# Patient Record
Sex: MALE | Race: WHITE | NOT HISPANIC OR LATINO | Employment: OTHER | URBAN - METROPOLITAN AREA
[De-identification: names, ages, dates, MRNs, and addresses within clinical notes are randomized per-mention and may not be internally consistent; named-entity substitution may affect disease eponyms.]

---

## 2017-04-03 ENCOUNTER — TELEPHONE (OUTPATIENT)
Dept: CARDIOLOGY | Facility: CLINIC | Age: 82
End: 2017-04-03

## 2017-04-03 NOTE — TELEPHONE ENCOUNTER
----- Message from Kayla Fam sent at 4/3/2017  1:06 PM CDT -----  Contact: Maame - wife  She returned your call.  Call her at [phone].                                 iverson

## 2017-04-04 ENCOUNTER — OFFICE VISIT (OUTPATIENT)
Dept: CARDIOLOGY | Facility: CLINIC | Age: 82
End: 2017-04-04
Payer: MEDICARE

## 2017-04-04 VITALS
DIASTOLIC BLOOD PRESSURE: 60 MMHG | WEIGHT: 205 LBS | SYSTOLIC BLOOD PRESSURE: 150 MMHG | BODY MASS INDEX: 27.17 KG/M2 | HEART RATE: 49 BPM | HEIGHT: 73 IN

## 2017-04-04 DIAGNOSIS — I49.3 PVC'S (PREMATURE VENTRICULAR CONTRACTIONS): Primary | ICD-10-CM

## 2017-04-04 DIAGNOSIS — I49.3 PVC'S (PREMATURE VENTRICULAR CONTRACTIONS): ICD-10-CM

## 2017-04-04 DIAGNOSIS — I10 ESSENTIAL HYPERTENSION: Primary | Chronic | ICD-10-CM

## 2017-04-04 DIAGNOSIS — E78.5 DYSLIPIDEMIA: Chronic | ICD-10-CM

## 2017-04-04 PROCEDURE — 99213 OFFICE O/P EST LOW 20 MIN: CPT | Mod: PBBFAC,PO | Performed by: NUCLEAR MEDICINE

## 2017-04-04 PROCEDURE — 93010 ELECTROCARDIOGRAM REPORT: CPT | Mod: S$PBB,,, | Performed by: NUCLEAR MEDICINE

## 2017-04-04 PROCEDURE — 99214 OFFICE O/P EST MOD 30 MIN: CPT | Mod: S$PBB,,, | Performed by: NUCLEAR MEDICINE

## 2017-04-04 PROCEDURE — 93005 ELECTROCARDIOGRAM TRACING: CPT | Mod: PBBFAC,PO | Performed by: NUCLEAR MEDICINE

## 2017-04-04 PROCEDURE — 99999 PR PBB SHADOW E&M-EST. PATIENT-LVL III: CPT | Mod: PBBFAC,,, | Performed by: NUCLEAR MEDICINE

## 2017-04-04 NOTE — PROGRESS NOTES
Subjective:   Patient ID:  Bijan Flores Jr. is a 88 y.o. male who presents for follow-up of Hypertension (6 month followup) and Hyperlipidemia      HPI 1- ESSENTIAL HTN   2- DYSLIPIDEMIA.  3- PAD- CAROTID ARTERY DISEASE  DOING WELL. NO HX OF RECENT HOSPITALIZATIONS FOR ACS. OR ADHF. OR ARRHYTHMIAS. OR TIA OR STROKE  NO HX OF ANGINA. OR EQUIVALENT  NO EXACERBATION OF PALPITATIONS. NO NEAR SYNCOPE OR SYNCOPE  NO EDEMA. NO INTERMITTENT CLAUDICATION  NO FOCAL CNS SYMPTOMS OR SIGNS TO SUGGEST TIA OR STROKE  CARD MED GOOD COMPLIANCE    Review of Systems   Constitution: Negative for chills, fever, weakness, night sweats, weight gain and weight loss.   HENT: Negative for headaches and nosebleeds.    Eyes: Negative for blurred vision, double vision and visual disturbance.   Cardiovascular: Negative for chest pain, dyspnea on exertion, irregular heartbeat, leg swelling, orthopnea, palpitations, paroxysmal nocturnal dyspnea and syncope.   Respiratory: Negative for cough, hemoptysis and wheezing.    Endocrine: Negative for polydipsia and polyuria.   Hematologic/Lymphatic: Does not bruise/bleed easily.   Skin: Negative for rash.   Musculoskeletal: Negative for joint pain, joint swelling, muscle weakness and myalgias.   Gastrointestinal: Negative for abdominal pain, hematemesis, jaundice and melena.   Genitourinary: Negative for dysuria, hematuria and nocturia.   Neurological: Negative for dizziness, focal weakness and sensory change.   Psychiatric/Behavioral: Negative for depression. The patient does not have insomnia and is not nervous/anxious.      History reviewed. No pertinent family history.  Past Medical History:   Diagnosis Date    Hyperlipidemia     Hypertension     Occlusion and stenosis of carotid artery without mention of cerebral infarction 6/2/2015    Syncope and collapse 5/22/2015     Current Outpatient Prescriptions on File Prior to Visit   Medication Sig Dispense Refill    carvedilol (COREG) 6.25 MG tablet  TAKE 1 TABLET (6.25 MG TOTAL) BY MOUTH 2 (TWO) TIMES DAILY WITH MEALS. 60 tablet 6    escitalopram oxalate (LEXAPRO) 20 MG tablet Take 1 tablet (20 mg total) by mouth once daily. 90 tablet 3    esomeprazole (NEXIUM) 40 MG capsule Take 1 capsule (40 mg total) by mouth once daily. 90 capsule 3    ezetimibe-simvastatin 10-20 mg (VYTORIN) 10-20 mg per tablet Take 1 tablet by mouth every evening. 90 tablet 3    fish oil-omega-3 fatty acids 300-1,000 mg capsule Take 1 g by mouth once daily.      fluticasone (FLONASE) 50 mcg/actuation nasal spray       lycopene 6 mg Cap Take 10 mg by mouth once daily.       tamsulosin (FLOMAX) 0.4 mg Cp24 Take 0.4 mg by mouth once daily.      valsartan-hydrochlorothiazide (DIOVAN-HCT) 320-12.5 mg per tablet TAKE 1 TABLET BY MOUTH ONCE DAILY. 90 tablet 3    FLUZONE HIGH-DOSE 2016-17, PF, 180 mcg/0.5 mL Syrg TO BE ADMINISTERED BY PHARMACIST FOR IMMUNIZATION  0    mupirocin (BACTROBAN) 2 % ointment Apply topically 2 (two) times daily. 15 g 0     No current facility-administered medications on file prior to visit.      Review of patient's allergies indicates:   Allergen Reactions    Iodinated contrast media - iv dye Anaphylaxis    Pcn [penicillins] Anaphylaxis       Objective:     Physical Exam   Constitutional: He is oriented to person, place, and time. He appears well-developed. No distress.   HENT:   Head: Normocephalic.   Eyes: Conjunctivae are normal. Pupils are equal, round, and reactive to light.   Neck: Neck supple. No JVD present. No thyromegaly present.   Cardiovascular: Regular rhythm, normal heart sounds and intact distal pulses.  Bradycardia present.  Exam reveals no gallop and no friction rub.    No murmur heard.  Pulses:       Carotid pulses are 2+ on the right side, and 2+ on the left side.       Radial pulses are 2+ on the right side, and 2+ on the left side.        Femoral pulses are 2+ on the right side, and 2+ on the left side.       Popliteal pulses are 2+ on  the right side, and 2+ on the left side.        Dorsalis pedis pulses are 2+ on the right side, and 2+ on the left side.        Posterior tibial pulses are 2+ on the right side, and 2+ on the left side.   Pulmonary/Chest: Breath sounds normal. He has no wheezes. He has no rales. He exhibits no tenderness.   Abdominal: Soft. Bowel sounds are normal. He exhibits no mass. There is no hepatosplenomegaly. There is no tenderness.   Musculoskeletal: He exhibits no edema or tenderness.        Cervical back: Normal.        Thoracic back: Normal.        Lumbar back: Normal.   Lymphadenopathy:     He has no cervical adenopathy.     He has no axillary adenopathy.        Right: No supraclavicular adenopathy present.        Left: No supraclavicular adenopathy present.   Neurological: He is alert and oriented to person, place, and time. He has normal strength. No sensory deficit. Gait normal.   Skin: Skin is warm. No cyanosis. No pallor. Nails show no clubbing.   Psychiatric: He has a normal mood and affect. His speech is normal and behavior is normal. Cognition and memory are normal.       Assessment:     1. Essential hypertension    2. Dyslipidemia    WELL CONTROLLED BP  STABLE CNS STATUS  STABLE CV STATUS  CARD MED WELL TOLERATED    Plan:     Essential hypertension    Dyslipidemia    1- CONTINUE PRESENT CARD MANAGEMENT    2- RETURN IN 6 MONTHS.      6/26/17-  ADDENDUM     1- CV STATUS STABLE-  NO EVIDENCE OF ACTIVE MYOCARDIAL ISCHEMIA. NO ARRHYTHMIAS. NO ACUTE HF.  BP WELL CONTROLLED    2- NO CV CONTRAINDICATIONS FOR INGUINAL HERNIA REPAIR AND ANESTHESIA    3- LOW RISK FOR ONESIMO OPERATIVE CV EVENTS    4-NO FURTHER CARD RECOMMENDATIONS OR WORK UP

## 2017-04-04 NOTE — MR AVS SNAPSHOT
[facility] - Cardiology  [address] [facility] [place] [address]  Phone: [phone]  Fax: [phone]                  Bijan Flores JrPaul   2017 9:00 AM   Office Visit    Description:  Male : 1928   Provider:  Geremias Scott MD   Department:  [facility]           Reason for Visit     Hypertension     Hyperlipidemia           Diagnoses this Visit        Comments    Essential hypertension    -  Primary     Dyslipidemia                To Do List           Goals (5 Years of Data)     None      Follow-Up and Disposition     Return in about 6 months (around 10/4/2017).      [facility] On Call     [facility] On Call Nurse Care Line -  Assistance  Unless otherwise directed by your provider, please contact [facility] On-Call, our nurse care line that is available for  assistance.     Registered nurses in the [facility] On Call Center provide: appointment scheduling, clinical advisement, health education, and other advisory services.  Call: [phone] (toll free)               Medications           Message regarding Medications     Verify the changes and/or additions to your medication regime listed below are the same as discussed with your clinician today.  If any of these changes or additions are incorrect, please notify your healthcare provider.             Verify that the below list of medications is an accurate representation of the medications you are currently taking.  If none reported, the list may be blank. If incorrect, please contact your healthcare provider. Carry this list with you in case of emergency.           Current Medications     carvedilol (COREG) 6.25 MG tablet TAKE 1 TABLET (6.25 MG TOTAL) BY MOUTH 2 (TWO) TIMES DAILY WITH MEALS.    escitalopram oxalate (LEXAPRO) 20 MG tablet Take 1 tablet (20 mg total) by mouth once daily.    esomeprazole (NEXIUM) 40 MG capsule Take 1 capsule (40 mg total) by mouth once daily.    ezetimibe-simvastatin 10-20 mg (VYTORIN) 10-20 mg per tablet  "Take 1 tablet by mouth every evening.    fish oil-omega-3 fatty acids 300-1,000 mg capsule Take 1 g by mouth once daily.    fluticasone (FLONASE) 50 mcg/actuation nasal spray     FLUZONE HIGH-DOSE 2016-17, PF, 180 mcg/0.5 mL Syrg TO BE ADMINISTERED BY PHARMACIST FOR IMMUNIZATION    lycopene 6 mg Cap Take 10 mg by mouth once daily.     mupirocin (BACTROBAN) 2 % ointment Apply topically 2 (two) times daily.    tamsulosin (FLOMAX) 0.4 mg Cp24 Take 0.4 mg by mouth once daily.    valsartan-hydrochlorothiazide (DIOVAN-HCT) 320-12.5 mg per tablet TAKE 1 TABLET BY MOUTH ONCE DAILY.           Clinical Reference Information           Your Vitals Were     BP Pulse Height Weight BMI    150/60 (BP Location: Right arm, Patient Position: Sitting, BP Method: Manual) 49 6' 1" (1.854 m) 93 kg (205 lb 0.4 oz) 27.05 kg/m2      Blood Pressure          Most Recent Value    BP  (!)  150/60      Allergies as of 4/4/2017     Iodinated Contrast Media - Iv Dye    Pcn [Penicillins]      Immunizations Administered on Date of Encounter - 4/4/2017     None      Language Assistance Services     ATTENTION: Language assistance services are available, free of charge. Please call [phone].      ATENCIÓN: Si habla español, tiene a ramirez disposición servicios gratuitos de asistencia lingüística. Llame al [phone].     JULIAN Ý: N?u b?n nói Ti?ng Vi?t, có các d?ch v? h? tr? ngôn ng? mi?n phí dành cho b?n. G?i s? [phone].         [facility] complies with applicable Federal civil rights laws and does not discriminate on the basis of race, color, national origin, age, disability, or sex.        "

## 2017-06-03 RX ORDER — CARVEDILOL 6.25 MG/1
TABLET ORAL
Qty: 60 TABLET | Refills: 6 | Status: SHIPPED | OUTPATIENT
Start: 2017-06-03 | End: 2017-07-14 | Stop reason: SINTOL

## 2017-06-14 RX ORDER — ESCITALOPRAM OXALATE 20 MG/1
20 TABLET ORAL DAILY
Qty: 90 TABLET | Refills: 3 | Status: SHIPPED | OUTPATIENT
Start: 2017-06-14 | End: 2018-04-17 | Stop reason: SDUPTHER

## 2017-06-22 ENCOUNTER — CLINICAL SUPPORT (OUTPATIENT)
Dept: CARDIOLOGY | Facility: CLINIC | Age: 82
End: 2017-06-22
Payer: MEDICARE

## 2017-06-22 DIAGNOSIS — Z01.810 PREOP CARDIOVASCULAR EXAM: Primary | ICD-10-CM

## 2017-06-22 DIAGNOSIS — R94.31 EKG ABNORMALITIES: ICD-10-CM

## 2017-06-22 DIAGNOSIS — Z01.810 PREOP CARDIOVASCULAR EXAM: ICD-10-CM

## 2017-06-22 PROCEDURE — 93005 ELECTROCARDIOGRAM TRACING: CPT | Mod: PBBFAC | Performed by: INTERNAL MEDICINE

## 2017-06-22 PROCEDURE — 93010 ELECTROCARDIOGRAM REPORT: CPT | Mod: S$PBB,,, | Performed by: INTERNAL MEDICINE

## 2017-06-23 ENCOUNTER — LAB VISIT (OUTPATIENT)
Dept: LAB | Facility: HOSPITAL | Age: 82
End: 2017-06-23
Attending: NUCLEAR MEDICINE
Payer: MEDICARE

## 2017-06-23 DIAGNOSIS — Z01.810 PREOP CARDIOVASCULAR EXAM: ICD-10-CM

## 2017-06-23 DIAGNOSIS — R94.31 EKG ABNORMALITIES: ICD-10-CM

## 2017-06-23 LAB
ALBUMIN SERPL BCP-MCNC: 3.8 G/DL
ALP SERPL-CCNC: 51 U/L
ALT SERPL W/O P-5'-P-CCNC: 10 U/L
ANION GAP SERPL CALC-SCNC: 8 MMOL/L
AST SERPL-CCNC: 14 U/L
BASOPHILS # BLD AUTO: 0.03 K/UL
BASOPHILS NFR BLD: 0.5 %
BILIRUB SERPL-MCNC: 1.2 MG/DL
BUN SERPL-MCNC: 14 MG/DL
CALCIUM SERPL-MCNC: 9 MG/DL
CHLORIDE SERPL-SCNC: 101 MMOL/L
CO2 SERPL-SCNC: 30 MMOL/L
CREAT SERPL-MCNC: 1.2 MG/DL
DIFFERENTIAL METHOD: ABNORMAL
EOSINOPHIL # BLD AUTO: 0.8 K/UL
EOSINOPHIL NFR BLD: 13.2 %
ERYTHROCYTE [DISTWIDTH] IN BLOOD BY AUTOMATED COUNT: 13.7 %
EST. GFR  (AFRICAN AMERICAN): >60 ML/MIN/1.73 M^2
EST. GFR  (NON AFRICAN AMERICAN): 53.3 ML/MIN/1.73 M^2
GLUCOSE SERPL-MCNC: 71 MG/DL
HCT VFR BLD AUTO: 42.9 %
HGB BLD-MCNC: 14.4 G/DL
LYMPHOCYTES # BLD AUTO: 1.5 K/UL
LYMPHOCYTES NFR BLD: 24.7 %
MCH RBC QN AUTO: 30.1 PG
MCHC RBC AUTO-ENTMCNC: 33.6 %
MCV RBC AUTO: 90 FL
MONOCYTES # BLD AUTO: 0.6 K/UL
MONOCYTES NFR BLD: 9.3 %
NEUTROPHILS # BLD AUTO: 3.1 K/UL
NEUTROPHILS NFR BLD: 52.1 %
PLATELET # BLD AUTO: 132 K/UL
PMV BLD AUTO: 11.9 FL
POTASSIUM SERPL-SCNC: 3.9 MMOL/L
PROT SERPL-MCNC: 6.7 G/DL
RBC # BLD AUTO: 4.78 M/UL
SODIUM SERPL-SCNC: 139 MMOL/L
WBC # BLD AUTO: 5.99 K/UL

## 2017-06-23 PROCEDURE — 36415 COLL VENOUS BLD VENIPUNCTURE: CPT | Mod: PO

## 2017-06-23 PROCEDURE — 85025 COMPLETE CBC W/AUTO DIFF WBC: CPT

## 2017-06-23 PROCEDURE — 80053 COMPREHEN METABOLIC PANEL: CPT

## 2017-07-01 DIAGNOSIS — I10 ESSENTIAL HYPERTENSION: ICD-10-CM

## 2017-07-01 RX ORDER — EZETIMIBE/SIMVASTATIN 10 MG-20MG
TABLET ORAL
Qty: 90 TABLET | Refills: 3 | Status: SHIPPED | OUTPATIENT
Start: 2017-07-01 | End: 2017-07-07 | Stop reason: SDUPTHER

## 2017-07-06 ENCOUNTER — TELEPHONE (OUTPATIENT)
Dept: CARDIOLOGY | Facility: CLINIC | Age: 82
End: 2017-07-06

## 2017-07-06 NOTE — TELEPHONE ENCOUNTER
----- Message from Antoinette Duran sent at 7/6/2017 12:01 PM CDT -----  Contact: Maame/wife  Maame called to see if the patient can be worked in today or tomorrow because BP was 250/197 today. She can be contacted at [phone] home or [phone] cell.    Thanks,  Antoinette

## 2017-07-07 ENCOUNTER — OFFICE VISIT (OUTPATIENT)
Dept: CARDIOLOGY | Facility: CLINIC | Age: 82
End: 2017-07-07
Payer: MEDICARE

## 2017-07-07 ENCOUNTER — CLINICAL SUPPORT (OUTPATIENT)
Dept: CARDIOLOGY | Facility: CLINIC | Age: 82
End: 2017-07-07
Payer: MEDICARE

## 2017-07-07 VITALS
WEIGHT: 207 LBS | BODY MASS INDEX: 27.43 KG/M2 | DIASTOLIC BLOOD PRESSURE: 74 MMHG | HEART RATE: 54 BPM | SYSTOLIC BLOOD PRESSURE: 156 MMHG | HEIGHT: 73 IN

## 2017-07-07 DIAGNOSIS — I10 ESSENTIAL HYPERTENSION: Primary | ICD-10-CM

## 2017-07-07 DIAGNOSIS — I10 ESSENTIAL HYPERTENSION: ICD-10-CM

## 2017-07-07 DIAGNOSIS — I10 BENIGN ESSENTIAL HTN: Primary | ICD-10-CM

## 2017-07-07 DIAGNOSIS — I10 BENIGN ESSENTIAL HTN: ICD-10-CM

## 2017-07-07 DIAGNOSIS — F41.9 ANXIETY: ICD-10-CM

## 2017-07-07 PROCEDURE — 1126F AMNT PAIN NOTED NONE PRSNT: CPT | Mod: ,,, | Performed by: NUCLEAR MEDICINE

## 2017-07-07 PROCEDURE — 99215 OFFICE O/P EST HI 40 MIN: CPT | Mod: S$PBB,,, | Performed by: NUCLEAR MEDICINE

## 2017-07-07 PROCEDURE — 1159F MED LIST DOCD IN RCRD: CPT | Mod: ,,, | Performed by: NUCLEAR MEDICINE

## 2017-07-07 PROCEDURE — 93010 ELECTROCARDIOGRAM REPORT: CPT | Mod: S$PBB,,, | Performed by: INTERNAL MEDICINE

## 2017-07-07 PROCEDURE — 93005 ELECTROCARDIOGRAM TRACING: CPT | Mod: PBBFAC,PO | Performed by: INTERNAL MEDICINE

## 2017-07-07 PROCEDURE — 99999 PR PBB SHADOW E&M-EST. PATIENT-LVL III: CPT | Mod: PBBFAC,,, | Performed by: NUCLEAR MEDICINE

## 2017-07-07 RX ORDER — SPIRONOLACTONE 25 MG/1
25 TABLET ORAL DAILY
Qty: 30 TABLET | Refills: 3 | Status: SHIPPED | OUTPATIENT
Start: 2017-07-07 | End: 2017-07-14 | Stop reason: SDUPTHER

## 2017-07-07 RX ORDER — EZETIMIBE AND SIMVASTATIN 10; 20 MG/1; MG/1
1 TABLET ORAL NIGHTLY
Qty: 90 TABLET | Refills: 3 | Status: SHIPPED | OUTPATIENT
Start: 2017-07-07 | End: 2018-06-04 | Stop reason: SDUPTHER

## 2017-07-07 RX ORDER — ALPRAZOLAM 0.25 MG/1
0.25 TABLET ORAL 2 TIMES DAILY PRN
Qty: 30 TABLET | Refills: 0 | Status: SHIPPED | OUTPATIENT
Start: 2017-07-07 | End: 2018-04-17

## 2017-07-07 NOTE — PROGRESS NOTES
Subjective:   Patient ID:  Bijan Flores Jr. is a 89 y.o. male who presents for follow-up of Hypertension      HPI  ESSENTIAL HTN.    WHILE AT RECOVERING PRIOR TO INGUINAL HERNIA REPAIR, WAS NOTICED HIS SBP  WAS SIGNIFICANTLY ELEVATED > 200  MMHG  PATIENT WAS ASYMPTOMATIC- NO ANGINA NO HEADACHE, NO UNUSUAL SOB, NO PALPITATIONS. NO FOCAL CNS SYMPTOMS OR SIGNS TO SUGGEST TIA OR STROKE  HOWEVER  HE HAS BEEN VERY ANXIOUS BECAUSE OF PROCEDURE  PRESENTLY- HE IS VERY COMFORTABLE  AND ASYMPTOMATIC  ECG TODAY- MILD SB 54 BMP. CHRONIC NS ST T CHANGES.    Review of Systems   Constitution: Negative for chills, fever, weakness, night sweats, weight gain and weight loss.   HENT: Negative for headaches and nosebleeds.    Eyes: Negative for blurred vision, double vision and visual disturbance.   Cardiovascular: Negative for chest pain, dyspnea on exertion, irregular heartbeat, leg swelling, orthopnea, palpitations, paroxysmal nocturnal dyspnea and syncope.   Respiratory: Negative for cough, hemoptysis and wheezing.    Endocrine: Negative for polydipsia and polyuria.   Hematologic/Lymphatic: Does not bruise/bleed easily.   Skin: Negative for rash.   Musculoskeletal: Negative for joint pain, joint swelling, muscle weakness and myalgias.   Gastrointestinal: Negative for abdominal pain, hematemesis, jaundice and melena.   Genitourinary: Negative for dysuria, hematuria and nocturia.   Neurological: Negative for dizziness, focal weakness and sensory change.   Psychiatric/Behavioral: Negative for depression. The patient is nervous/anxious. The patient does not have insomnia.      History reviewed. No pertinent family history.  Past Medical History:   Diagnosis Date    Hyperlipidemia     Hypertension     Occlusion and stenosis of carotid artery without mention of cerebral infarction 6/2/2015    Syncope and collapse 5/22/2015     Current Outpatient Prescriptions on File Prior to Visit   Medication Sig Dispense Refill    carvedilol  (COREG) 6.25 MG tablet TAKE 1 TABLET (6.25 MG TOTAL) BY MOUTH 2 (TWO) TIMES DAILY WITH MEALS. 60 tablet 6    escitalopram oxalate (LEXAPRO) 20 MG tablet TAKE 1 TABLET (20 MG TOTAL) BY MOUTH ONCE DAILY. 90 tablet 3    esomeprazole (NEXIUM) 40 MG capsule Take 1 capsule (40 mg total) by mouth once daily. 90 capsule 3    fish oil-omega-3 fatty acids 300-1,000 mg capsule Take 1 g by mouth once daily.      fluticasone (FLONASE) 50 mcg/actuation nasal spray       lycopene 6 mg Cap Take 10 mg by mouth once daily.       mupirocin (BACTROBAN) 2 % ointment Apply topically 2 (two) times daily. 15 g 0    tamsulosin (FLOMAX) 0.4 mg Cp24 Take 0.4 mg by mouth once daily.      valsartan-hydrochlorothiazide (DIOVAN-HCT) 320-12.5 mg per tablet TAKE 1 TABLET BY MOUTH ONCE DAILY. 90 tablet 3    VYTORIN 10-20 10-20 mg per tablet TAKE 1 TABLET EVERY EVENING 90 tablet 3    [DISCONTINUED] FLUZONE HIGH-DOSE 2016-17, PF, 180 mcg/0.5 mL Syrg TO BE ADMINISTERED BY PHARMACIST FOR IMMUNIZATION  0     No current facility-administered medications on file prior to visit.      Review of patient's allergies indicates:   Allergen Reactions    Iodinated contrast- oral and iv dye Anaphylaxis    Pcn [penicillins] Anaphylaxis       Objective:     Physical Exam   Constitutional: He is oriented to person, place, and time. He appears well-developed. No distress.   HENT:   Head: Normocephalic.   Eyes: Conjunctivae are normal. Pupils are equal, round, and reactive to light.   Neck: Neck supple. No JVD present. No thyromegaly present.   Cardiovascular: Normal rate, regular rhythm, normal heart sounds and intact distal pulses.  Exam reveals no gallop and no friction rub.    No murmur heard.  Pulses:       Carotid pulses are 2+ on the right side, and 2+ on the left side.       Radial pulses are 2+ on the right side, and 2+ on the left side.        Femoral pulses are 2+ on the right side, and 2+ on the left side.       Popliteal pulses are 2+ on the  right side, and 2+ on the left side.        Dorsalis pedis pulses are 2+ on the right side, and 2+ on the left side.        Posterior tibial pulses are 2+ on the right side, and 2+ on the left side.   Pulmonary/Chest: Breath sounds normal. He has no wheezes. He has no rales. He exhibits no tenderness.   Abdominal: Soft. Bowel sounds are normal. He exhibits no mass. There is no hepatosplenomegaly. There is no tenderness.   Musculoskeletal: He exhibits no edema or tenderness.        Cervical back: Normal.        Thoracic back: Normal.        Lumbar back: Normal.   Lymphadenopathy:     He has no cervical adenopathy.     He has no axillary adenopathy.        Right: No supraclavicular adenopathy present.        Left: No supraclavicular adenopathy present.   Neurological: He is alert and oriented to person, place, and time. He has normal strength. No sensory deficit. Gait normal.   Skin: Skin is warm. No cyanosis. No pallor. Nails show no clubbing.   Psychiatric: He has a normal mood and affect. His speech is normal and behavior is normal. Cognition and memory are normal.       Assessment:     1. Essential hypertension      UNCONTROLLED SBP STAGE 1  NO CLINICAL EVIDENCE OF ACTIVE MYOCARDIAL ISCHEMIA. NO ARRHYTHMIAS. NO ADHF  CNS STATUS STABLE    Plan:     Essential hypertension    1- START ALPRAZOLAM  0.25 MG BID    2- START ALDACTONE 25 MG AT BEDTIME    3- RETURN IN ONE WEEK WITH BMP

## 2017-07-14 ENCOUNTER — LAB VISIT (OUTPATIENT)
Dept: LAB | Facility: HOSPITAL | Age: 82
End: 2017-07-14
Attending: NUCLEAR MEDICINE
Payer: MEDICARE

## 2017-07-14 ENCOUNTER — OFFICE VISIT (OUTPATIENT)
Dept: CARDIOLOGY | Facility: CLINIC | Age: 82
End: 2017-07-14
Payer: MEDICARE

## 2017-07-14 VITALS
DIASTOLIC BLOOD PRESSURE: 78 MMHG | BODY MASS INDEX: 27.61 KG/M2 | SYSTOLIC BLOOD PRESSURE: 134 MMHG | HEART RATE: 44 BPM | WEIGHT: 208.31 LBS | HEIGHT: 73 IN

## 2017-07-14 DIAGNOSIS — I10 ESSENTIAL HYPERTENSION: Primary | ICD-10-CM

## 2017-07-14 DIAGNOSIS — I10 ESSENTIAL HYPERTENSION: ICD-10-CM

## 2017-07-14 LAB
ANION GAP SERPL CALC-SCNC: 8 MMOL/L
BUN SERPL-MCNC: 18 MG/DL
CALCIUM SERPL-MCNC: 8.6 MG/DL
CHLORIDE SERPL-SCNC: 103 MMOL/L
CO2 SERPL-SCNC: 26 MMOL/L
CREAT SERPL-MCNC: 1.3 MG/DL
EST. GFR  (AFRICAN AMERICAN): 55.9 ML/MIN/1.73 M^2
EST. GFR  (NON AFRICAN AMERICAN): 48.4 ML/MIN/1.73 M^2
GLUCOSE SERPL-MCNC: 108 MG/DL
POTASSIUM SERPL-SCNC: 3.7 MMOL/L
SODIUM SERPL-SCNC: 137 MMOL/L

## 2017-07-14 PROCEDURE — 1159F MED LIST DOCD IN RCRD: CPT | Mod: ,,, | Performed by: NUCLEAR MEDICINE

## 2017-07-14 PROCEDURE — 99213 OFFICE O/P EST LOW 20 MIN: CPT | Mod: PBBFAC,PO | Performed by: NUCLEAR MEDICINE

## 2017-07-14 PROCEDURE — 99214 OFFICE O/P EST MOD 30 MIN: CPT | Mod: S$PBB,,, | Performed by: NUCLEAR MEDICINE

## 2017-07-14 PROCEDURE — 99999 PR PBB SHADOW E&M-EST. PATIENT-LVL III: CPT | Mod: PBBFAC,,, | Performed by: NUCLEAR MEDICINE

## 2017-07-14 RX ORDER — SPIRONOLACTONE 25 MG/1
50 TABLET ORAL DAILY
Qty: 30 TABLET | Refills: 3
Start: 2017-07-14 | End: 2017-07-17 | Stop reason: SDUPTHER

## 2017-07-14 NOTE — PROGRESS NOTES
Subjective:   Patient ID:  Bijan Flores Jr. is a 89 y.o. male who presents for follow-up of Hypertension      HPI ESSENTIAL HYPERTENSION  STILL HAVING PROBLEMS WITH BP AT HOME AND ALSO SLOWING HR IN THE 40- ASYMPTOMATIC  NO NEAR SYNCOPE OR SYNCOPE  NO UNUSUAL KUHN. NO FATIGUE   NO ORTHOPNEA OR PND  NO EDEMA. NO INTERMITTENT CLAUDICATION  NO FOCAL CNS SYMPTOMS OR SIGNS TO SUGGEST TIA OR STROKE    Review of Systems   Constitution: Negative for chills, fever, weakness, night sweats, weight gain and weight loss.   HENT: Negative for headaches and nosebleeds.    Eyes: Negative for blurred vision, double vision and visual disturbance.   Cardiovascular: Negative for chest pain, dyspnea on exertion, irregular heartbeat, leg swelling, orthopnea, palpitations, paroxysmal nocturnal dyspnea and syncope.   Respiratory: Negative for cough, hemoptysis and wheezing.    Endocrine: Negative for polydipsia and polyuria.   Hematologic/Lymphatic: Does not bruise/bleed easily.   Skin: Negative for rash.   Musculoskeletal: Negative for joint pain, joint swelling, muscle weakness and myalgias.   Gastrointestinal: Negative for abdominal pain, hematemesis, jaundice and melena.   Genitourinary: Negative for dysuria, hematuria and nocturia.   Neurological: Negative for dizziness, focal weakness and sensory change.   Psychiatric/Behavioral: Negative for depression. The patient does not have insomnia and is not nervous/anxious.      History reviewed. No pertinent family history.  Past Medical History:   Diagnosis Date    Hyperlipidemia     Hypertension     Occlusion and stenosis of carotid artery without mention of cerebral infarction 6/2/2015    Syncope and collapse 5/22/2015     Current Outpatient Prescriptions on File Prior to Visit   Medication Sig Dispense Refill    alprazolam (XANAX) 0.25 MG tablet Take 1 tablet (0.25 mg total) by mouth 2 (two) times daily as needed for Anxiety. 30 tablet 0    escitalopram oxalate (LEXAPRO) 20 MG  tablet TAKE 1 TABLET (20 MG TOTAL) BY MOUTH ONCE DAILY. 90 tablet 3    esomeprazole (NEXIUM) 40 MG capsule Take 1 capsule (40 mg total) by mouth once daily. 90 capsule 3    ezetimibe-simvastatin 10-20 mg (VYTORIN 10-20) 10-20 mg per tablet Take 1 tablet by mouth every evening. 90 tablet 3    fluticasone (FLONASE) 50 mcg/actuation nasal spray       lycopene 6 mg Cap Take 10 mg by mouth once daily.       mupirocin (BACTROBAN) 2 % ointment Apply topically 2 (two) times daily. 15 g 0    tamsulosin (FLOMAX) 0.4 mg Cp24 Take 0.4 mg by mouth once daily.      valsartan-hydrochlorothiazide (DIOVAN-HCT) 320-12.5 mg per tablet TAKE 1 TABLET BY MOUTH ONCE DAILY. 90 tablet 3    [DISCONTINUED] carvedilol (COREG) 6.25 MG tablet TAKE 1 TABLET (6.25 MG TOTAL) BY MOUTH 2 (TWO) TIMES DAILY WITH MEALS. 60 tablet 6    [DISCONTINUED] spironolactone (ALDACTONE) 25 MG tablet Take 1 tablet (25 mg total) by mouth once daily. 30 tablet 3    [DISCONTINUED] fish oil-omega-3 fatty acids 300-1,000 mg capsule Take 1 g by mouth once daily.       No current facility-administered medications on file prior to visit.      Review of patient's allergies indicates:   Allergen Reactions    Iodinated contrast- oral and iv dye Anaphylaxis    Pcn [penicillins] Anaphylaxis       Objective:     Physical Exam   Constitutional: He is oriented to person, place, and time. He appears well-developed. No distress.   HENT:   Head: Normocephalic.   Eyes: Conjunctivae are normal. Pupils are equal, round, and reactive to light.   Neck: Neck supple. No JVD present. No thyromegaly present.   Cardiovascular: Regular rhythm, normal heart sounds and intact distal pulses.  Bradycardia present.  Exam reveals no gallop and no friction rub.    No murmur heard.  Pulses:       Carotid pulses are 2+ on the right side, and 2+ on the left side.       Radial pulses are 2+ on the right side, and 2+ on the left side.        Femoral pulses are 2+ on the right side, and 2+ on  the left side.       Popliteal pulses are 2+ on the right side, and 2+ on the left side.        Dorsalis pedis pulses are 2+ on the right side, and 2+ on the left side.        Posterior tibial pulses are 2+ on the right side, and 2+ on the left side.   Pulmonary/Chest: Breath sounds normal. He has no wheezes. He has no rales. He exhibits no tenderness.   Abdominal: Soft. Bowel sounds are normal. He exhibits no mass. There is no hepatosplenomegaly. There is no tenderness.   Musculoskeletal: He exhibits no edema or tenderness.        Cervical back: Normal.        Thoracic back: Normal.        Lumbar back: Normal.   Lymphadenopathy:     He has no cervical adenopathy.     He has no axillary adenopathy.        Right: No supraclavicular adenopathy present.        Left: No supraclavicular adenopathy present.   Neurological: He is alert and oriented to person, place, and time. He has normal strength. No sensory deficit. Gait normal.   Skin: Skin is warm. No cyanosis. No pallor. Nails show no clubbing.   Psychiatric: He has a normal mood and affect. His speech is normal and behavior is normal. Cognition and memory are normal.       Assessment:     1. Essential hypertension      WE NEED TO CONTINUE OMT ANTIHYPERTENSIVE MEDS  Plan:     Essential hypertension  -     spironolactone (ALDACTONE) 25 MG tablet; Take 2 tablets (50 mg total) by mouth once daily.  Dispense: 30 tablet; Refill: 3    1- DC COREG    2- INCREASE ALDACTONE    3- CONTINUE DIOVAN/ HCTZ

## 2017-07-17 DIAGNOSIS — I10 ESSENTIAL HYPERTENSION: ICD-10-CM

## 2017-07-17 RX ORDER — SPIRONOLACTONE 25 MG/1
50 TABLET ORAL DAILY
Qty: 180 TABLET | Refills: 3 | Status: SHIPPED | OUTPATIENT
Start: 2017-07-17 | End: 2018-07-13 | Stop reason: SDUPTHER

## 2017-08-02 RX ORDER — VALSARTAN AND HYDROCHLOROTHIAZIDE 320; 12.5 MG/1; MG/1
TABLET, FILM COATED ORAL
Qty: 90 TABLET | Refills: 3 | Status: SHIPPED | OUTPATIENT
Start: 2017-08-02 | End: 2018-07-24 | Stop reason: SDUPTHER

## 2017-08-14 ENCOUNTER — OFFICE VISIT (OUTPATIENT)
Dept: CARDIOLOGY | Facility: CLINIC | Age: 82
End: 2017-08-14
Payer: MEDICARE

## 2017-08-14 ENCOUNTER — LAB VISIT (OUTPATIENT)
Dept: LAB | Facility: HOSPITAL | Age: 82
End: 2017-08-14
Attending: NUCLEAR MEDICINE
Payer: MEDICARE

## 2017-08-14 VITALS
HEIGHT: 72 IN | WEIGHT: 202 LBS | BODY MASS INDEX: 27.36 KG/M2 | DIASTOLIC BLOOD PRESSURE: 60 MMHG | SYSTOLIC BLOOD PRESSURE: 116 MMHG

## 2017-08-14 DIAGNOSIS — I10 ESSENTIAL HYPERTENSION: ICD-10-CM

## 2017-08-14 DIAGNOSIS — I10 ESSENTIAL HYPERTENSION: Primary | ICD-10-CM

## 2017-08-14 LAB
ANION GAP SERPL CALC-SCNC: 9 MMOL/L
BUN SERPL-MCNC: 22 MG/DL
CALCIUM SERPL-MCNC: 8.7 MG/DL
CHLORIDE SERPL-SCNC: 104 MMOL/L
CO2 SERPL-SCNC: 26 MMOL/L
CREAT SERPL-MCNC: 1.5 MG/DL
EST. GFR  (AFRICAN AMERICAN): 47 ML/MIN/1.73 M^2
EST. GFR  (NON AFRICAN AMERICAN): 41 ML/MIN/1.73 M^2
GLUCOSE SERPL-MCNC: 94 MG/DL
POTASSIUM SERPL-SCNC: 3.9 MMOL/L
SODIUM SERPL-SCNC: 139 MMOL/L

## 2017-08-14 PROCEDURE — 99999 PR PBB SHADOW E&M-EST. PATIENT-LVL III: CPT | Mod: PBBFAC,,, | Performed by: NUCLEAR MEDICINE

## 2017-08-14 PROCEDURE — 1159F MED LIST DOCD IN RCRD: CPT | Mod: ,,, | Performed by: NUCLEAR MEDICINE

## 2017-08-14 PROCEDURE — 36415 COLL VENOUS BLD VENIPUNCTURE: CPT | Mod: PO

## 2017-08-14 PROCEDURE — 1126F AMNT PAIN NOTED NONE PRSNT: CPT | Mod: ,,, | Performed by: NUCLEAR MEDICINE

## 2017-08-14 PROCEDURE — 3008F BODY MASS INDEX DOCD: CPT | Mod: ,,, | Performed by: NUCLEAR MEDICINE

## 2017-08-14 PROCEDURE — 99214 OFFICE O/P EST MOD 30 MIN: CPT | Mod: S$PBB,,, | Performed by: NUCLEAR MEDICINE

## 2017-08-14 PROCEDURE — 80048 BASIC METABOLIC PNL TOTAL CA: CPT | Mod: PO

## 2017-08-14 NOTE — PROGRESS NOTES
Subjective:   Patient ID:  Bijan Flores Jr. is a 89 y.o. male who presents for follow-up of Hypertension (4 week followup) and Shortness of Breath      HPI 1- ESSENTIAL HTN.  DOING WELL.  CARD MED WELL TOLERATED  NO RECENT FALLS. NO NEAR SYNCOPE OR SYNCOPE  NO HX OF ANGINA OR EQUIVALENT. NO HX OF PLEURITIC CP  NO HX OF PALPITATIONS. NO   NO HX OF UNUSUAL KUHN. NO ORTHOPNEA OR PND  NO EDEMA. NO CALVE TENDERNESS  NO INTERMITTENT CLAUDICATION    Review of Systems   Constitution: Negative for chills, fever, weakness, night sweats, weight gain and weight loss.   HENT: Negative for headaches and nosebleeds.    Eyes: Negative for blurred vision, double vision and visual disturbance.   Cardiovascular: Negative for chest pain, dyspnea on exertion, irregular heartbeat, leg swelling, orthopnea, palpitations, paroxysmal nocturnal dyspnea and syncope.   Respiratory: Negative for cough, hemoptysis and wheezing.    Endocrine: Negative for polydipsia and polyuria.   Hematologic/Lymphatic: Does not bruise/bleed easily.   Skin: Negative for rash.   Musculoskeletal: Negative for joint pain, joint swelling, muscle weakness and myalgias.   Gastrointestinal: Negative for abdominal pain, hematemesis, jaundice and melena.   Genitourinary: Negative for dysuria, hematuria and nocturia.   Neurological: Negative for dizziness, focal weakness and sensory change.   Psychiatric/Behavioral: Negative for depression. The patient does not have insomnia and is not nervous/anxious.      History reviewed. No pertinent family history.  Past Medical History:   Diagnosis Date    Hyperlipidemia     Hypertension     Occlusion and stenosis of carotid artery without mention of cerebral infarction 6/2/2015    Syncope and collapse 5/22/2015     Current Outpatient Prescriptions on File Prior to Visit   Medication Sig Dispense Refill    alprazolam (XANAX) 0.25 MG tablet Take 1 tablet (0.25 mg total) by mouth 2 (two) times daily as needed for Anxiety. 30  tablet 0    escitalopram oxalate (LEXAPRO) 20 MG tablet TAKE 1 TABLET (20 MG TOTAL) BY MOUTH ONCE DAILY. 90 tablet 3    esomeprazole (NEXIUM) 40 MG capsule Take 1 capsule (40 mg total) by mouth once daily. 90 capsule 3    ezetimibe-simvastatin 10-20 mg (VYTORIN 10-20) 10-20 mg per tablet Take 1 tablet by mouth every evening. 90 tablet 3    fluticasone (FLONASE) 50 mcg/actuation nasal spray       lycopene 6 mg Cap Take 10 mg by mouth once daily.       spironolactone (ALDACTONE) 25 MG tablet Take 2 tablets (50 mg total) by mouth once daily. 180 tablet 3    tamsulosin (FLOMAX) 0.4 mg Cp24 Take 0.4 mg by mouth once daily.      valsartan-hydrochlorothiazide (DIOVAN-HCT) 320-12.5 mg per tablet TAKE 1 TABLET BY MOUTH ONCE DAILY. 90 tablet 3    [DISCONTINUED] mupirocin (BACTROBAN) 2 % ointment Apply topically 2 (two) times daily. 15 g 0     No current facility-administered medications on file prior to visit.      Review of patient's allergies indicates:   Allergen Reactions    Iodinated contrast- oral and iv dye Anaphylaxis    Pcn [penicillins] Anaphylaxis       Objective:     Physical Exam   Constitutional: He is oriented to person, place, and time. He appears well-developed. No distress.   HENT:   Head: Normocephalic.   Eyes: Conjunctivae are normal. Pupils are equal, round, and reactive to light.   Neck: Neck supple. No JVD present. No thyromegaly present.   Cardiovascular: Normal rate, regular rhythm, normal heart sounds and intact distal pulses.  Exam reveals no gallop and no friction rub.    No murmur heard.  Pulses:       Carotid pulses are 2+ on the right side, and 2+ on the left side.       Radial pulses are 2+ on the right side, and 2+ on the left side.        Femoral pulses are 2+ on the right side, and 2+ on the left side.       Popliteal pulses are 2+ on the right side, and 2+ on the left side.        Dorsalis pedis pulses are 2+ on the right side, and 2+ on the left side.        Posterior tibial  pulses are 2+ on the right side, and 2+ on the left side.   Pulmonary/Chest: Breath sounds normal. He has no wheezes. He has no rales. He exhibits no tenderness.   Abdominal: Soft. Bowel sounds are normal. He exhibits no mass. There is no hepatosplenomegaly. There is no tenderness.   Musculoskeletal: He exhibits no edema or tenderness.        Cervical back: Normal.        Thoracic back: Normal.        Lumbar back: Normal.   Lymphadenopathy:     He has no cervical adenopathy.     He has no axillary adenopathy.        Right: No supraclavicular adenopathy present.        Left: No supraclavicular adenopathy present.   Neurological: He is alert and oriented to person, place, and time. He has normal strength. No sensory deficit. Gait normal.   Skin: Skin is warm. No cyanosis. No pallor. Nails show no clubbing.   Psychiatric: He has a normal mood and affect. His speech is normal and behavior is normal. Cognition and memory are normal.       Assessment:     1. Essential hypertension      WELL CONTROLLED BP  STABLE CV STATUS  STABLE CNS STATUS    Plan:     Essential hypertension    1- LOW RISK FOR PERIOPERATIVE CV EVENTS - INGUINAL HERNIA REPAIR AND ANESTHESIA    2- CONTINUE PRESENT CARD MANAGEMENT    3- START BMP    4- RETURN IN 3 MONTHS WITH BMP

## 2017-11-20 ENCOUNTER — OFFICE VISIT (OUTPATIENT)
Dept: CARDIOLOGY | Facility: CLINIC | Age: 82
End: 2017-11-20
Payer: MEDICARE

## 2017-11-20 VITALS
HEART RATE: 56 BPM | HEIGHT: 72 IN | WEIGHT: 205.25 LBS | DIASTOLIC BLOOD PRESSURE: 70 MMHG | BODY MASS INDEX: 27.8 KG/M2 | SYSTOLIC BLOOD PRESSURE: 126 MMHG

## 2017-11-20 DIAGNOSIS — I10 ESSENTIAL HYPERTENSION: Primary | ICD-10-CM

## 2017-11-20 DIAGNOSIS — I65.21 CAROTID ARTERY CALCIFICATION, RIGHT: Chronic | ICD-10-CM

## 2017-11-20 DIAGNOSIS — Z98.890 HISTORY OF RIGHT-SIDED CAROTID ENDARTERECTOMY: Chronic | ICD-10-CM

## 2017-11-20 PROCEDURE — 99214 OFFICE O/P EST MOD 30 MIN: CPT | Mod: S$PBB,,, | Performed by: NUCLEAR MEDICINE

## 2017-11-20 PROCEDURE — 99999 PR PBB SHADOW E&M-EST. PATIENT-LVL III: CPT | Mod: PBBFAC,,, | Performed by: NUCLEAR MEDICINE

## 2017-11-20 PROCEDURE — 99213 OFFICE O/P EST LOW 20 MIN: CPT | Mod: PBBFAC,PO | Performed by: NUCLEAR MEDICINE

## 2017-11-20 RX ORDER — CIPROFLOXACIN HYDROCHLORIDE 3 MG/ML
1 SOLUTION/ DROPS OPHTHALMIC 3 TIMES DAILY
COMMUNITY
End: 2018-05-21 | Stop reason: ALTCHOICE

## 2017-11-20 NOTE — PROGRESS NOTES
Subjective:   Patient ID:  Bijan Flores Jr. is a 89 y.o. male who presents for follow-up of Hypertension and Peripheral Arterial Disease (CAROTID ART DISEASE)      HPI 1-ESSENTIAL HTN  2- PAD- CAROTID ARTERY DISEAE- POST RIGHT CEA  3- DYSLIPIDEMIA  DOING WELL. NO NEAR SYNCOPE OR SYNCOPE  NO FOCAL CNS SYMPTOMS OR SIGNS TO SUGGEST TIA OR STROKE  NO HX OF ANGINA OR EQUIVALENT  NO PALPITATIONS  NO UNUSUAL KUHN OR ORTHOPNEA OR PND  NO EDEMA.   NO INTERMITTENT CLAUDICATION  CARD MED GOOD COMPLIANCE    Review of Systems   Constitution: Negative for chills, fever, weakness, night sweats, weight gain and weight loss.   HENT: Negative for nosebleeds.    Eyes: Negative for blurred vision, double vision and visual disturbance.   Cardiovascular: Negative for chest pain, dyspnea on exertion, irregular heartbeat, leg swelling, orthopnea, palpitations, paroxysmal nocturnal dyspnea and syncope.   Respiratory: Negative for cough, hemoptysis and wheezing.    Endocrine: Negative for polydipsia and polyuria.   Hematologic/Lymphatic: Does not bruise/bleed easily.   Skin: Negative for rash.   Musculoskeletal: Negative for joint pain, joint swelling, muscle weakness and myalgias.   Gastrointestinal: Negative for abdominal pain, hematemesis, jaundice and melena.   Genitourinary: Negative for dysuria, hematuria and nocturia.   Neurological: Negative for dizziness, focal weakness, headaches and sensory change.   Psychiatric/Behavioral: Negative for depression. The patient does not have insomnia and is not nervous/anxious.      History reviewed. No pertinent family history.  Past Medical History:   Diagnosis Date    Hyperlipidemia     Hypertension     Occlusion and stenosis of carotid artery without mention of cerebral infarction 6/2/2015    Syncope and collapse 5/22/2015     Current Outpatient Prescriptions on File Prior to Visit   Medication Sig Dispense Refill    escitalopram oxalate (LEXAPRO) 20 MG tablet TAKE 1 TABLET (20 MG TOTAL)  BY MOUTH ONCE DAILY. 90 tablet 3    esomeprazole (NEXIUM) 40 MG capsule Take 1 capsule (40 mg total) by mouth once daily. 90 capsule 3    ezetimibe-simvastatin 10-20 mg (VYTORIN 10-20) 10-20 mg per tablet Take 1 tablet by mouth every evening. 90 tablet 3    fluticasone (FLONASE) 50 mcg/actuation nasal spray       lycopene 6 mg Cap Take 10 mg by mouth once daily.       spironolactone (ALDACTONE) 25 MG tablet Take 2 tablets (50 mg total) by mouth once daily. 180 tablet 3    tamsulosin (FLOMAX) 0.4 mg Cp24 Take 0.4 mg by mouth once daily.      valsartan-hydrochlorothiazide (DIOVAN-HCT) 320-12.5 mg per tablet TAKE 1 TABLET BY MOUTH ONCE DAILY. 90 tablet 3    alprazolam (XANAX) 0.25 MG tablet Take 1 tablet (0.25 mg total) by mouth 2 (two) times daily as needed for Anxiety. 30 tablet 0     No current facility-administered medications on file prior to visit.      Review of patient's allergies indicates:   Allergen Reactions    Iodinated contrast- oral and iv dye Anaphylaxis    Pcn [penicillins] Anaphylaxis       Objective:     Physical Exam   Constitutional: He is oriented to person, place, and time. He appears well-developed. No distress.   HENT:   Head: Normocephalic.   Eyes: Conjunctivae are normal. Pupils are equal, round, and reactive to light.   Neck: Neck supple. No JVD present. No thyromegaly present.   Cardiovascular: Normal rate, regular rhythm, normal heart sounds and intact distal pulses.  Exam reveals no gallop and no friction rub.    No murmur heard.  Pulses:       Carotid pulses are 2+ on the right side, and 2+ on the left side.       Radial pulses are 2+ on the right side, and 2+ on the left side.        Femoral pulses are 2+ on the right side, and 2+ on the left side.       Popliteal pulses are 2+ on the right side, and 2+ on the left side.        Dorsalis pedis pulses are 2+ on the right side, and 2+ on the left side.        Posterior tibial pulses are 2+ on the right side, and 2+ on the left  side.   Pulmonary/Chest: Breath sounds normal. He has no wheezes. He has no rales. He exhibits no tenderness.   Abdominal: Soft. Bowel sounds are normal. He exhibits no mass. There is no hepatosplenomegaly. There is no tenderness.   Musculoskeletal: He exhibits no edema or tenderness.        Cervical back: Normal.        Thoracic back: Normal.        Lumbar back: Normal.   Lymphadenopathy:     He has no cervical adenopathy.     He has no axillary adenopathy.        Right: No supraclavicular adenopathy present.        Left: No supraclavicular adenopathy present.   Neurological: He is alert and oriented to person, place, and time. He has normal strength. No sensory deficit. Gait normal.   Skin: Skin is warm. No cyanosis. No pallor. Nails show no clubbing.   Psychiatric: He has a normal mood and affect. His speech is normal and behavior is normal. Cognition and memory are normal.       Assessment:     1. Essential hypertension    2. Carotid artery calcification, right    3. History of right-sided carotid endarterectomy      WELL CONTROLLED BP  STABLE CV STATUS  STABLE CNS STATUS  CARD MED WELL TOLERATED  Plan:     Essential hypertension  -     CBC auto differential; Future; Expected date: 11/20/2017  -     Comprehensive metabolic panel; Future; Expected date: 11/20/2017  -     Lipid panel; Future; Expected date: 11/20/2017    Carotid artery calcification, right  -     CBC auto differential; Future; Expected date: 11/20/2017  -     Comprehensive metabolic panel; Future; Expected date: 11/20/2017  -     Lipid panel; Future; Expected date: 11/20/2017    History of right-sided carotid endarterectomy  -     CBC auto differential; Future; Expected date: 11/20/2017  -     Comprehensive metabolic panel; Future; Expected date: 11/20/2017  -     Lipid panel; Future; Expected date: 11/20/2017      1- CONTINUE PRESENT CARD MANAGEMENT    2- RETURN IN 6 MONTHS WIT CBC, CMP AND LIPIDS

## 2018-04-17 ENCOUNTER — OFFICE VISIT (OUTPATIENT)
Dept: INTERNAL MEDICINE | Facility: CLINIC | Age: 83
End: 2018-04-17
Payer: MEDICARE

## 2018-04-17 VITALS
WEIGHT: 198.44 LBS | HEIGHT: 72 IN | SYSTOLIC BLOOD PRESSURE: 130 MMHG | BODY MASS INDEX: 26.88 KG/M2 | TEMPERATURE: 98 F | HEART RATE: 58 BPM | DIASTOLIC BLOOD PRESSURE: 78 MMHG

## 2018-04-17 DIAGNOSIS — Z00.00 MEDICARE ANNUAL WELLNESS VISIT, SUBSEQUENT: Primary | ICD-10-CM

## 2018-04-17 DIAGNOSIS — E78.5 DYSLIPIDEMIA: Chronic | ICD-10-CM

## 2018-04-17 DIAGNOSIS — N64.4 BREAST TENDERNESS: ICD-10-CM

## 2018-04-17 DIAGNOSIS — I10 ESSENTIAL HYPERTENSION: ICD-10-CM

## 2018-04-17 DIAGNOSIS — F41.1 GAD (GENERALIZED ANXIETY DISORDER): ICD-10-CM

## 2018-04-17 DIAGNOSIS — Z23 NEED FOR PNEUMOCOCCAL VACCINATION: ICD-10-CM

## 2018-04-17 DIAGNOSIS — F33.9 EPISODE OF RECURRENT MAJOR DEPRESSIVE DISORDER, UNSPECIFIED DEPRESSION EPISODE SEVERITY: ICD-10-CM

## 2018-04-17 PROCEDURE — 99213 OFFICE O/P EST LOW 20 MIN: CPT | Mod: PBBFAC,PO | Performed by: FAMILY MEDICINE

## 2018-04-17 PROCEDURE — 99214 OFFICE O/P EST MOD 30 MIN: CPT | Mod: S$PBB,,, | Performed by: FAMILY MEDICINE

## 2018-04-17 PROCEDURE — 99999 PR PBB SHADOW E&M-EST. PATIENT-LVL III: CPT | Mod: PBBFAC,,, | Performed by: FAMILY MEDICINE

## 2018-04-17 PROCEDURE — G0009 ADMIN PNEUMOCOCCAL VACCINE: HCPCS | Mod: PBBFAC,PO

## 2018-04-17 RX ORDER — ESOMEPRAZOLE MAGNESIUM 40 MG/1
40 CAPSULE, DELAYED RELEASE ORAL DAILY
Qty: 90 CAPSULE | Refills: 3 | Status: SHIPPED | OUTPATIENT
Start: 2018-04-17

## 2018-04-17 RX ORDER — BUSPIRONE HYDROCHLORIDE 5 MG/1
5 TABLET ORAL 2 TIMES DAILY
Qty: 180 TABLET | Refills: 3 | Status: SHIPPED | OUTPATIENT
Start: 2018-04-17

## 2018-04-17 RX ORDER — ESCITALOPRAM OXALATE 20 MG/1
20 TABLET ORAL DAILY
Qty: 90 TABLET | Refills: 3 | Status: SHIPPED | OUTPATIENT
Start: 2018-04-17

## 2018-04-17 NOTE — PROGRESS NOTES
Subjective:      Patient ID: Bijan Flores Jr. is a 89 y.o. male.    Chief Complaint: Establish Care and Annual Exam    Disclaimer:  This note is prepared using voice recognition software and as such is likely to have errors and has not been proof read. Please contact me for questions.     Bijan Flores Jr. is a 89 y.o. male who presents today to establish care.  Prior patient of his cardiologist Dr. Mota.  Has been following him ever since Karla.  Has a history of hypertension hyperlipidemia, GERD, anxiety and depression with recently more anxiety issues.  His wife Maame is here with him today.  He's been on Lexapro at 20 mg ever since 2005.  Lately they've been going through some remodeling issues and having some frustrations with the company that's doing the repairs.  She feels like he does need something different.  He has used Xanax before but it was only for 5 days.  We did discuss potentially be either changing his current therapy of Lexapro or possibly doing BuSpar.  They were okay with adding the BuSpar initially at this time.    He does see Dr. Sarah helton as his urologist.  Currently on Flomax at this time.  Doesn't really do PSAs any further.    Reflux symptoms are currently controlled with Nexium at 40 mg.    Is going to Dr. Mota in May and has upcoming labs.  Currently on Vytorin for cholesterol at this time.    Also currently on blood pressure medications including spironolactone which is the newest medication.  Does recently reports some breast tenderness.  No masses palpated.  Not problematic for the patient wanted to point out and may be related to the spironolactone use.    Last tetanus shot was in 2005.  Is willing to update his Prevnar today.  Did receive a flu shot this season.        Lab Results   Component Value Date    WBC 5.99 06/23/2017    HGB 14.4 06/23/2017    HCT 42.9 06/23/2017     (L) 06/23/2017    CHOL 116 (L) 09/20/2016    TRIG 65 09/20/2016    HDL 41 09/20/2016     ALT 10 06/23/2017    AST 14 06/23/2017     08/14/2017    K 3.9 08/14/2017     08/14/2017    CREATININE 1.5 (H) 08/14/2017    BUN 22 08/14/2017    CO2 26 08/14/2017       Review of Systems   Constitutional: Negative for chills, fatigue and unexpected weight change.   HENT: Negative for congestion, ear pain, postnasal drip, sneezing, sore throat and trouble swallowing.    Eyes: Negative for pain and visual disturbance.   Respiratory: Negative for cough and shortness of breath.    Cardiovascular: Negative for chest pain and leg swelling.   Gastrointestinal: Negative for abdominal pain, constipation, diarrhea, nausea and vomiting.   Endocrine: Negative for cold intolerance and heat intolerance.   Genitourinary: Negative for difficulty urinating, dysuria and flank pain.   Musculoskeletal: Negative for arthralgias, back pain, joint swelling and neck pain.   Skin: Negative for color change and rash.   Neurological: Negative for dizziness, seizures and headaches.   Psychiatric/Behavioral: Negative for behavioral problems, dysphoric mood and sleep disturbance. The patient is nervous/anxious.      Objective:     Vitals:    04/17/18 1241   BP: 130/78   Pulse: (!) 58   Temp: 98.4 °F (36.9 °C)   TempSrc: Tympanic   Weight: 90 kg (198 lb 6.6 oz)   Height: 6' (1.829 m)     Physical Exam   Constitutional: He is oriented to person, place, and time. He appears well-developed and well-nourished. No distress.   HENT:   Head: Normocephalic and atraumatic.   Right Ear: External ear normal.   Left Ear: External ear normal.   Nose: Nose normal.   Mouth/Throat: Oropharynx is clear and moist.   Eyes: EOM are normal. Pupils are equal, round, and reactive to light.   Neck: Normal range of motion. Neck supple. No thyromegaly present.   Cardiovascular: Normal rate and regular rhythm.  Exam reveals no gallop and no friction rub.    No murmur heard.  Pulmonary/Chest: Effort normal and breath sounds normal. No respiratory distress.  He exhibits tenderness. He exhibits no mass, no edema, no deformity and no swelling.   Abdominal: Soft. Bowel sounds are normal. He exhibits no distension. There is no tenderness. There is no rebound.   Musculoskeletal: Normal range of motion.   Lymphadenopathy:     He has no cervical adenopathy.   Neurological: He is alert and oriented to person, place, and time. Coordination normal.   Skin: Skin is warm and dry.   Psychiatric: He has a normal mood and affect.   Vitals reviewed.    Assessment:     1. Essential hypertension    2. JAYLYN (generalized anxiety disorder)    3. Episode of recurrent major depressive disorder, unspecified depression episode severity    4. Dyslipidemia    5. Need for pneumococcal vaccination    6. Breast tenderness      Plan:   Bijan was seen today for establish care and annual exam.    Diagnoses and all orders for this visit:    Essential hypertension- stable, labs through his cardiologist.  Discussed need to possibly mention the spironolactone as a side effect of the breast tenderness with Dr. BANSAL as next visit    JAYLYN (generalized anxiety disorder)-not controlled at BuSpar 5 mg twice a day.  Continue Lexapro 20    Episode of recurrent major depressive disorder, unspecified depression episode severity-continue with Lexapro 20 adding BuSpar to help with anxiety issues    Dyslipidemia-currently on Vytorin continue with this medication labs through his cardiologist    Breast tenderness-may need to discuss this with his cardiologist about this prolactin.  No palpable mass today.    Other orders    -     busPIRone (BUSPAR) 5 MG Tab; Take 1 tablet (5 mg total) by mouth 2 (two) times daily.  -     escitalopram oxalate (LEXAPRO) 20 MG tablet; Take 1 tablet (20 mg total) by mouth once daily.  -     esomeprazole (NEXIUM) 40 MG capsule; Take 1 capsule (40 mg total) by mouth once daily.            Follow-up in about 6 months (around 10/17/2018) for chronic issues Dr Hewitt.

## 2018-04-17 NOTE — PROGRESS NOTES
Bijan Flores presented for a follow-up Medicare AWV today. The following components were reviewed and updated:    · Medical history  · Family History  · Social history  · Allergies and Current Medications  · Health Maintenance  · Patient Care Team:  · Mary Hewitt MD as PCP - General (Family Medicine)   · Dr. Mota-cardiology  · Dr. Guerra- urologist    **See Completed Assessments for Annual Wellness visit with in the encounter summary    · Medicare wellness assessment:  ·   Depression screening  · 1. In the past 2 weeks she has the patient felt down, depressed or hopeless? No  · 2. Over the past 2 weeks as the patient felt little interest or pleasure in doing things?  No  ·  Functional Ability/ Safety Screening  · 1. Was the  Patient's timed up and go test unsteady or longer than 30 seconds?  No   · 2. Has the patient needed help with transportation shopping preparing meals housework laundry medications are managing money?  No  · 3.  If the patient's home have rugs in the hallway, back grab bars in the bathroom, or poor lighting? No, not necessary per patient.   · 4.has there been any hearing difficulties?  Yes, has hearing aids.   · Advance Directives:  · 1. Patient does not have a living will in place.     Vitals:    04/17/18 1241   BP: 130/78   Pulse: (!) 58   Temp: 98.4 °F (36.9 °C)   TempSrc: Tympanic   Weight: 90 kg (198 lb 6.6 oz)   Height: 6' (1.829 m)     Body mass index is 26.91 kg/m².   ]      Medicare Annual Wellness Visit, Subsequent   Patient Active Problem List   Diagnosis    Dyslipidemia    Essential hypertension    PVCs (premature ventricular contractions)    Dizziness    Syncope and collapse    History of right-sided carotid endarterectomy    Carotid artery calcification, right    JAYLYN (generalized anxiety disorder)    MDD (major depressive disorder)         Provided Bijan with a 5-10 year written screening schedule and personal prevention plan. Recommendations were developed using  the USPSTF age appropriate recommendations. Education, counseling, and referrals were provided as needed.  After Visit Summary printed and given to patient which includes a list of additional screenings\tests needed.    Health Maintenance   Topic Date Due    Pneumococcal (65+) (2 of 2 - PCV13) 09/09/2012    TETANUS VACCINE  01/01/2015    Lipid Panel  05/18/2018 (Originally 9/20/2017)    Zoster Vaccine  Addressed    Influenza Vaccine  Completed     Update Prevnar today.  Declines updated tetanus shot as this would need to be obtained through his pharmacy.  Declines shingles vaccine.  Follow-up in about 6 months (around 10/17/2018) for chronic issues Dr Hewitt.      Mary Hewitt MD

## 2018-04-25 ENCOUNTER — TELEPHONE (OUTPATIENT)
Dept: INTERNAL MEDICINE | Facility: CLINIC | Age: 83
End: 2018-04-25

## 2018-04-25 NOTE — TELEPHONE ENCOUNTER
Called and spoke with pts wife. She stated that we sent in for him to have the nexium 40 mg but it has too many side effects so he isn't going to take that. That he will just continue on taking the nexium 20 mg from [facility].

## 2018-04-25 NOTE — TELEPHONE ENCOUNTER
----- Message from Loyd Fowler sent at 4/25/2018 10:23 AM CDT -----  Contact: pt's spouse  She's calling in regards to the pt's RX medication, [facility] advice, [phone] (home)

## 2018-05-18 ENCOUNTER — LAB VISIT (OUTPATIENT)
Dept: LAB | Facility: HOSPITAL | Age: 83
End: 2018-05-18
Attending: NUCLEAR MEDICINE
Payer: MEDICARE

## 2018-05-18 DIAGNOSIS — Z98.890 HISTORY OF RIGHT-SIDED CAROTID ENDARTERECTOMY: Chronic | ICD-10-CM

## 2018-05-18 DIAGNOSIS — I10 ESSENTIAL HYPERTENSION: ICD-10-CM

## 2018-05-18 DIAGNOSIS — I65.21 CAROTID ARTERY CALCIFICATION, RIGHT: Chronic | ICD-10-CM

## 2018-05-18 LAB
ALBUMIN SERPL BCP-MCNC: 4 G/DL
ALP SERPL-CCNC: 55 U/L
ALT SERPL W/O P-5'-P-CCNC: 7 U/L
ANION GAP SERPL CALC-SCNC: 7 MMOL/L
AST SERPL-CCNC: 13 U/L
BASOPHILS # BLD AUTO: 0.06 K/UL
BASOPHILS NFR BLD: 1 %
BILIRUB SERPL-MCNC: 1.1 MG/DL
BUN SERPL-MCNC: 26 MG/DL
CALCIUM SERPL-MCNC: 9.2 MG/DL
CHLORIDE SERPL-SCNC: 103 MMOL/L
CHOLEST SERPL-MCNC: 121 MG/DL
CHOLEST/HDLC SERPL: 2.7 {RATIO}
CO2 SERPL-SCNC: 28 MMOL/L
CREAT SERPL-MCNC: 1.6 MG/DL
DIFFERENTIAL METHOD: ABNORMAL
EOSINOPHIL # BLD AUTO: 0.6 K/UL
EOSINOPHIL NFR BLD: 9 %
ERYTHROCYTE [DISTWIDTH] IN BLOOD BY AUTOMATED COUNT: 13.1 %
EST. GFR  (AFRICAN AMERICAN): 43.5 ML/MIN/1.73 M^2
EST. GFR  (NON AFRICAN AMERICAN): 37.6 ML/MIN/1.73 M^2
GLUCOSE SERPL-MCNC: 97 MG/DL
HCT VFR BLD AUTO: 40.7 %
HDLC SERPL-MCNC: 45 MG/DL
HDLC SERPL: 37.2 %
HGB BLD-MCNC: 13.5 G/DL
IMM GRANULOCYTES # BLD AUTO: 0.01 K/UL
IMM GRANULOCYTES NFR BLD AUTO: 0.2 %
LDLC SERPL CALC-MCNC: 57.6 MG/DL
LYMPHOCYTES # BLD AUTO: 1.7 K/UL
LYMPHOCYTES NFR BLD: 26.7 %
MCH RBC QN AUTO: 31.5 PG
MCHC RBC AUTO-ENTMCNC: 33.2 G/DL
MCV RBC AUTO: 95 FL
MONOCYTES # BLD AUTO: 0.6 K/UL
MONOCYTES NFR BLD: 8.7 %
NEUTROPHILS # BLD AUTO: 3.4 K/UL
NEUTROPHILS NFR BLD: 54.4 %
NONHDLC SERPL-MCNC: 76 MG/DL
NRBC BLD-RTO: 0 /100 WBC
PLATELET # BLD AUTO: 128 K/UL
PMV BLD AUTO: 11.6 FL
POTASSIUM SERPL-SCNC: 4.5 MMOL/L
PROT SERPL-MCNC: 6.7 G/DL
RBC # BLD AUTO: 4.28 M/UL
SODIUM SERPL-SCNC: 138 MMOL/L
TRIGL SERPL-MCNC: 92 MG/DL
WBC # BLD AUTO: 6.3 K/UL

## 2018-05-18 PROCEDURE — 85025 COMPLETE CBC W/AUTO DIFF WBC: CPT

## 2018-05-18 PROCEDURE — 36415 COLL VENOUS BLD VENIPUNCTURE: CPT | Mod: PO

## 2018-05-18 PROCEDURE — 80061 LIPID PANEL: CPT

## 2018-05-18 PROCEDURE — 80053 COMPREHEN METABOLIC PANEL: CPT

## 2018-05-21 ENCOUNTER — OFFICE VISIT (OUTPATIENT)
Dept: CARDIOLOGY | Facility: CLINIC | Age: 83
End: 2018-05-21
Payer: MEDICARE

## 2018-05-21 VITALS
SYSTOLIC BLOOD PRESSURE: 120 MMHG | DIASTOLIC BLOOD PRESSURE: 60 MMHG | WEIGHT: 196 LBS | HEIGHT: 71 IN | HEART RATE: 60 BPM | BODY MASS INDEX: 27.44 KG/M2

## 2018-05-21 DIAGNOSIS — E78.5 DYSLIPIDEMIA: Chronic | ICD-10-CM

## 2018-05-21 DIAGNOSIS — I65.21 CAROTID ARTERY CALCIFICATION, RIGHT: Chronic | ICD-10-CM

## 2018-05-21 DIAGNOSIS — I10 ESSENTIAL HYPERTENSION: Primary | ICD-10-CM

## 2018-05-21 PROCEDURE — 99999 PR PBB SHADOW E&M-EST. PATIENT-LVL III: CPT | Mod: PBBFAC,,, | Performed by: NUCLEAR MEDICINE

## 2018-05-21 PROCEDURE — 99213 OFFICE O/P EST LOW 20 MIN: CPT | Mod: PBBFAC,PO | Performed by: NUCLEAR MEDICINE

## 2018-05-21 PROCEDURE — 99214 OFFICE O/P EST MOD 30 MIN: CPT | Mod: S$PBB,,, | Performed by: NUCLEAR MEDICINE

## 2018-05-21 NOTE — PROGRESS NOTES
Subjective:   Patient ID:  Bijan Flores Jr. is a 89 y.o. male who presents for follow-up of Hypertension (6 month followup); Hyperlipidemia; and Peripheral Arterial Disease (carotid artery disease)      HPI 1-ESSENTIAL HYPERTENSION,  2- DYSLIPIDEMIA  3- PAD- CAROTID ART DISEASE- POST RIGHT CEA  DOING WELL. NO RECURRENT NEAR SYNCOPE OR SYNCOPE  NO FOCAL CNS SYMPTOMS OR SIGNS TO SUGGEST TIA OR STROKE  NO CHEST DISCOMFORT.  NO UNUSUAL KUHN. NO ORTHOPNEA OR PND  NO PALPITATIONS.   NO EDEMA. NO INTERMITTENT CLAUDICATION  NO ABDOMINAL DISCOMFORT  CARD MED WELL TOLERATED    Review of Systems   Constitution: Negative for chills, decreased appetite, fever, weakness, malaise/fatigue, night sweats, weight gain and weight loss.   HENT: Negative for nosebleeds.    Eyes: Negative for blurred vision, double vision and visual disturbance.   Cardiovascular: Negative for chest pain, claudication, cyanosis, dyspnea on exertion, irregular heartbeat, leg swelling, near-syncope, orthopnea, palpitations, paroxysmal nocturnal dyspnea and syncope.   Respiratory: Negative for cough, hemoptysis, shortness of breath, sleep disturbances due to breathing, snoring and wheezing.    Endocrine: Negative for cold intolerance, heat intolerance, polydipsia and polyuria.   Hematologic/Lymphatic: Does not bruise/bleed easily.   Skin: Negative for flushing and rash.   Musculoskeletal: Negative for gout, joint pain, joint swelling, muscle weakness and myalgias.   Gastrointestinal: Negative for abdominal pain, anorexia, change in bowel habit, constipation, diarrhea, dysphagia, heartburn, hematemesis, hematochezia, jaundice, melena, nausea and vomiting.   Genitourinary: Negative for decreased libido, dysuria, frequency, hematuria, nocturia and urgency.   Neurological: Negative for difficulty with concentration, excessive daytime sleepiness, dizziness, focal weakness, headaches, light-headedness, numbness, seizures, sensory change, tremors and vertigo.    Psychiatric/Behavioral: Negative for depression and memory loss. The patient does not have insomnia and is not nervous/anxious.    Allergic/Immunologic: Negative for environmental allergies and hives.     Family History   Problem Relation Age of Onset    Stroke Father      Past Medical History:   Diagnosis Date    JAYLYN (generalized anxiety disorder)     Hyperlipidemia     Hypertension     Left inguinal hernia     MDD (major depressive disorder)     Occlusion and stenosis of carotid artery without mention of cerebral infarction 6/2/2015    Syncope and collapse 5/22/2015     Current Outpatient Prescriptions on File Prior to Visit   Medication Sig Dispense Refill    busPIRone (BUSPAR) 5 MG Tab Take 1 tablet (5 mg total) by mouth 2 (two) times daily. 180 tablet 3    escitalopram oxalate (LEXAPRO) 20 MG tablet Take 1 tablet (20 mg total) by mouth once daily. 90 tablet 3    esomeprazole (NEXIUM) 40 MG capsule Take 1 capsule (40 mg total) by mouth once daily. 90 capsule 3    ezetimibe-simvastatin 10-20 mg (VYTORIN 10-20) 10-20 mg per tablet Take 1 tablet by mouth every evening. 90 tablet 3    fluticasone (FLONASE) 50 mcg/actuation nasal spray       lycopene 6 mg Cap Take 10 mg by mouth once daily.       spironolactone (ALDACTONE) 25 MG tablet Take 2 tablets (50 mg total) by mouth once daily. 180 tablet 3    tamsulosin (FLOMAX) 0.4 mg Cp24 Take 0.4 mg by mouth once daily.      valsartan-hydrochlorothiazide (DIOVAN-HCT) 320-12.5 mg per tablet TAKE 1 TABLET BY MOUTH ONCE DAILY. 90 tablet 3    [DISCONTINUED] ciprofloxacin HCl (CILOXAN) 0.3 % ophthalmic solution Place 1 drop into the left eye 3 (three) times daily.       No current facility-administered medications on file prior to visit.      Review of patient's allergies indicates:   Allergen Reactions    Iodinated contrast- oral and iv dye Anaphylaxis    Pcn [penicillins] Anaphylaxis       Objective:     Physical Exam   Constitutional: He is oriented to  person, place, and time. He appears well-developed. No distress.   HENT:   Head: Normocephalic.   Eyes: Conjunctivae are normal. Pupils are equal, round, and reactive to light.   Neck: Neck supple. No JVD present. No thyromegaly present.   Cardiovascular: Normal rate, regular rhythm, normal heart sounds and intact distal pulses.  Exam reveals no gallop and no friction rub.    No murmur heard.  Pulses:       Carotid pulses are 2+ on the right side, and 2+ on the left side.       Radial pulses are 2+ on the right side, and 2+ on the left side.        Femoral pulses are 2+ on the right side, and 2+ on the left side.       Popliteal pulses are 2+ on the right side, and 2+ on the left side.        Dorsalis pedis pulses are 2+ on the right side, and 2+ on the left side.        Posterior tibial pulses are 2+ on the right side, and 2+ on the left side.   Pulmonary/Chest: Breath sounds normal. He has no wheezes. He has no rales. He exhibits no tenderness.   Abdominal: Soft. Bowel sounds are normal. He exhibits no mass. There is no hepatosplenomegaly. There is no tenderness.   Musculoskeletal: He exhibits no edema or tenderness.        Cervical back: Normal.        Thoracic back: Normal.        Lumbar back: Normal.   Lymphadenopathy:     He has no cervical adenopathy.     He has no axillary adenopathy.        Right: No supraclavicular adenopathy present.        Left: No supraclavicular adenopathy present.   Neurological: He is alert and oriented to person, place, and time. He has normal strength. No sensory deficit. Gait normal.   Skin: Skin is warm. No cyanosis. No pallor. Nails show no clubbing.   Psychiatric: He has a normal mood and affect. His speech is normal and behavior is normal. Cognition and memory are normal.       Assessment:     1. Essential hypertension    2. Carotid artery calcification, right    3. Dyslipidemia      WELL CONTROLLED BP  STABLE CNS STATUS AND PAD  NO ACTIVE MYOCARDIAL ISCHEMIA  NO  ARRHYTHMIAS.  NO ADHF.  CARD MED WELL TOLERATED  Plan:     Essential hypertension    Carotid artery calcification, right    Dyslipidemia    1-  CONTINUE PRESENT CARD MANAGEMENT    2- RETURN IN 6 MONTHS.

## 2018-06-04 DIAGNOSIS — I10 ESSENTIAL HYPERTENSION: ICD-10-CM

## 2018-06-04 RX ORDER — EZETIMIBE AND SIMVASTATIN 10; 20 MG/1; MG/1
TABLET ORAL
Qty: 90 TABLET | Refills: 3 | Status: SHIPPED | OUTPATIENT
Start: 2018-06-04

## 2018-07-13 DIAGNOSIS — I10 ESSENTIAL HYPERTENSION: ICD-10-CM

## 2018-07-13 RX ORDER — SPIRONOLACTONE 25 MG/1
50 TABLET ORAL DAILY
Qty: 180 TABLET | Refills: 3 | Status: SHIPPED | OUTPATIENT
Start: 2018-07-13

## 2018-07-24 RX ORDER — VALSARTAN AND HYDROCHLOROTHIAZIDE 320; 12.5 MG/1; MG/1
TABLET, FILM COATED ORAL
Qty: 90 TABLET | Refills: 3 | Status: SHIPPED | OUTPATIENT
Start: 2018-07-24

## 2018-07-26 ENCOUNTER — OFFICE VISIT (OUTPATIENT)
Dept: URGENT CARE | Facility: CLINIC | Age: 83
End: 2018-07-26
Payer: MEDICARE

## 2018-07-26 VITALS
SYSTOLIC BLOOD PRESSURE: 120 MMHG | BODY MASS INDEX: 27.33 KG/M2 | DIASTOLIC BLOOD PRESSURE: 90 MMHG | WEIGHT: 196 LBS | OXYGEN SATURATION: 97 % | RESPIRATION RATE: 16 BRPM | TEMPERATURE: 98 F

## 2018-07-26 DIAGNOSIS — S65.311A LACERATION OF DEEP PALMAR ARCH OF RIGHT HAND, INITIAL ENCOUNTER: Primary | ICD-10-CM

## 2018-07-26 DIAGNOSIS — M79.641 PAIN OF RIGHT HAND: ICD-10-CM

## 2018-07-26 PROCEDURE — 99214 OFFICE O/P EST MOD 30 MIN: CPT | Mod: PBBFAC,PO | Performed by: NURSE PRACTITIONER

## 2018-07-26 PROCEDURE — 99999 PR PBB SHADOW E&M-EST. PATIENT-LVL IV: CPT | Mod: PBBFAC,,, | Performed by: NURSE PRACTITIONER

## 2018-07-26 PROCEDURE — 90471 IMMUNIZATION ADMIN: CPT | Mod: PBBFAC,PO

## 2018-07-26 PROCEDURE — 99214 OFFICE O/P EST MOD 30 MIN: CPT | Mod: S$PBB,,, | Performed by: NURSE PRACTITIONER

## 2018-07-26 RX ORDER — LIDOCAINE HYDROCHLORIDE 10 MG/ML
4 INJECTION, SOLUTION EPIDURAL; INFILTRATION; INTRACAUDAL; PERINEURAL
Status: COMPLETED | OUTPATIENT
Start: 2018-07-26 | End: 2018-07-26

## 2018-07-26 RX ORDER — MUPIROCIN 20 MG/G
OINTMENT TOPICAL 3 TIMES DAILY
Qty: 22 G | Refills: 0 | Status: SHIPPED | OUTPATIENT
Start: 2018-07-26

## 2018-07-26 RX ADMIN — LIDOCAINE HYDROCHLORIDE 40 MG: 10 INJECTION, SOLUTION EPIDURAL; INFILTRATION; INTRACAUDAL; PERINEURAL at 03:07

## 2018-07-26 NOTE — PATIENT INSTRUCTIONS
Extremity Laceration: Sutures, Staples, or Tape  A laceration is a cut through the skin. If it is deep, it may require stitches (sutures) or staples to close so it can heal. Minor cuts may be treated with surgical tape closures.   X-rays may be done if something may have entered the skin through the cut. You may also need a tetanus shot if you are not up to date on this vaccination.  Home care  · Follow the health care providers instructions on how to care for the cut.  · Wash your hands with soap and warm water before and after caring for your wound. This is to help prevent infection.  · Keep the wound clean and dry. If a bandage was applied and it becomes wet or dirty, replace it. Otherwise, leave it in place for the first 24 hours, then change it once a day or as directed.  · If sutures or staples were used, clean the wound daily:  · After removing the bandage, wash the area with soap and water. Use a wet cotton swab to loosen and remove any blood or crust that forms.  · After cleaning, keep the wound clean and dry. Talk with your doctor before applying any antibiotic ointment to the wound. Reapply the bandage.  · You may remove the bandage to shower as usual after the first 24 hours, but do not soak the area in water (no swimming) until the stitches or staples are removed.  · If surgical tape closures were used, keep the area clean and dry. If it becomes wet, blot it dry with a towel.  · The doctor may prescribe an antibiotic cream or ointment to prevent infection. Do not stop taking this medication until you have finished the prescribed course or the doctor tells you to stop. The doctor may also prescribe medications for pain. Follow the doctors instructions for taking these medications.  · Avoid activities that may reopen your wound.  Follow-up care  Follow up with your health care provider. Most skin wounds heal within ten days. However, an infection may sometimes occur despite proper treatment.  Therefore, check the wound daily for the signs of infection listed below. Stitches and staples should be removed within 7-14 days. If surgical tape closures were used, you may remove them after 10 days if they have not fallen off by then.   When to seek medical advice  Call your health care provider right away if any of these occur:  · Wound bleeding not controlled by direct pressure  · Signs of infection, including increasing pain in the wound, increasing wound redness or swelling, or pus or bad odor coming from the wound  · Fever of 100.4°F (38ºC) or higher or as directed by your healthcare provider  · Stitches or staples come apart or fall out or surgical tape falls off before 7 days  · Wound edges re-open  · Wound changes colors  · Numbness around the wound   · Decreased movement around the injured area  Date Last Reviewed: 6/14/2015  © 4341-5468 [employer]. [address] [place] [address], [place] [address]. All rights reserved. This information is not intended as a substitute for professional medical care. Always follow your healthcare professional's instructions.

## 2018-07-26 NOTE — PROGRESS NOTES
Subjective:       Patient ID: Bijan Flores Jr. is a 89 y.o. male.    Chief Complaint: Puncture Wound    89 year old male presents to Urgent Care with reports of laceration to right hand that occurred just prior to arrival. Patient reports that he was at [facility] when he tripped over a walker which caused injury. Denies any other problems or concerns at this time.       Laceration    The incident occurred 1 to 3 hours ago. The laceration is located on the right hand. The laceration is 3 cm in size. The laceration mechanism was a metal edge. The pain is at a severity of 3/10. The pain is mild. The pain has been constant since onset. He reports no foreign bodies present. His tetanus status is out of date (last tetanus was in 2015).     Review of Systems   Constitutional: Negative for appetite change, chills and fever.   HENT: Negative for ear pain, sinus pressure, sore throat and trouble swallowing.    Eyes: Negative for visual disturbance.   Respiratory: Negative for shortness of breath.    Cardiovascular: Negative for chest pain.   Gastrointestinal: Negative for abdominal pain, diarrhea, nausea and vomiting.   Endocrine: Negative for cold intolerance, polyphagia and polyuria.   Genitourinary: Negative for decreased urine volume and dysuria.   Musculoskeletal: Negative for back pain.   Skin: Negative for rash.        Right hand laceration   Allergic/Immunologic: Negative for environmental allergies and food allergies.   Neurological: Negative for dizziness, tremors, weakness and numbness.   Hematological: Does not bruise/bleed easily.   Psychiatric/Behavioral: Negative for confusion and hallucinations. The patient is not nervous/anxious and is not hyperactive.    All other systems reviewed and are negative.      Objective:     Physical Exam   Constitutional: He is oriented to person, place, and time. He appears well-developed and well-nourished.   HENT:   Head: Normocephalic.   Right Ear: External ear normal.   Left  Ear: External ear normal.   Nose: Nose normal.   Mouth/Throat: Oropharynx is clear and moist.   Eyes: Conjunctivae and EOM are normal. Pupils are equal, round, and reactive to light.   Neck: Normal range of motion. Neck supple. No JVD present.   Cardiovascular: Normal rate, regular rhythm, normal heart sounds and intact distal pulses.    Pulmonary/Chest: Effort normal and breath sounds normal. He has no wheezes.   Abdominal: Soft. Bowel sounds are normal. There is no tenderness.   Musculoskeletal:        Hands:  Lymphadenopathy:     He has no cervical adenopathy.   Neurological: He is alert and oriented to person, place, and time.   Skin: Skin is warm and dry.   Psychiatric: He has a normal mood and affect. His behavior is normal. Judgment and thought content normal.   Nursing note and vitals reviewed.  Laceration Repair  Body area: Right Hand (Palm)  Location details:Right Palm  Laceration length #1: irregular flap laceration closed with 5 # 3 Ethilon sutures  Laceration length #2: linear laceration approximately 3 cm in length- closed with 5 #3 Ethilon sutures    Foreign bodies: no foreign bodies  Anesthesia: local infiltration  Local anesthetic: lidocaine 1% without epinephrine  Preparation: Patient was prepped and draped using aseptic technique.  Irrigation solution: saline of 500 mls  Skin closure: #3 Ethilon sutures  Number of sutures/staples:10  Approximation: close  Approximation difficulty: simple  Dressing: Cleaned area with normal saline, applied triple antibiotic ointment and covered with sterile 4X4 guaze.   Patient tolerance: Patient tolerated the procedure well with no immediate complications    Assessment:     1. Laceration of deep palmar arch of right hand, initial encounter    2. Pain of right hand      Plan:   Bijan was seen today for puncture wound.    Diagnoses and all orders for this visit:    Laceration of deep palmar arch of right hand, initial encounter    Pain of right hand    Other  orders  -     Tdap Vaccine  -     mupirocin (BACTROBAN) 2 % ointment; Apply topically 3 (three) times daily.  -     lidocaine (PF) 10 mg/ml (1%) injection 40 mg; Inject 4 mLs (40 mg total) into the skin one time.

## 2018-08-06 ENCOUNTER — OFFICE VISIT (OUTPATIENT)
Dept: URGENT CARE | Facility: CLINIC | Age: 83
End: 2018-08-06
Payer: MEDICARE

## 2018-08-06 VITALS
HEART RATE: 80 BPM | SYSTOLIC BLOOD PRESSURE: 110 MMHG | DIASTOLIC BLOOD PRESSURE: 80 MMHG | BODY MASS INDEX: 26.9 KG/M2 | WEIGHT: 192.88 LBS | OXYGEN SATURATION: 80 % | TEMPERATURE: 98 F

## 2018-08-06 DIAGNOSIS — Z48.02 VISIT FOR SUTURE REMOVAL: Primary | ICD-10-CM

## 2018-08-06 PROCEDURE — 99214 OFFICE O/P EST MOD 30 MIN: CPT | Mod: S$PBB,,, | Performed by: NURSE PRACTITIONER

## 2018-08-06 PROCEDURE — 99213 OFFICE O/P EST LOW 20 MIN: CPT | Mod: PBBFAC,PO | Performed by: NURSE PRACTITIONER

## 2018-08-06 PROCEDURE — 99999 PR PBB SHADOW E&M-EST. PATIENT-LVL III: CPT | Mod: PBBFAC,,, | Performed by: NURSE PRACTITIONER

## 2018-08-06 NOTE — PATIENT INSTRUCTIONS
"  Suture or Staple Removal  You were seen today for a suture or staple removal. Your wound is healing as expected. The wound has healed well enough that the sutures or staples can be removed. The wound will continue to heal for the next few months.  At this time there is no sign of infection.   Home care  · If you have pain, take pain medicine as advised by your healthcare provider.   · Keep your wound clean and protected by covering it with a bandage for the next week or so.   · Wash your hands with soap and warm water before and after caring for your wound. This helps prevent infection.  · Clean the wound gently with soap and warm water daily or as directed by your childs health care provider. Do not use iodine, alcohol, or other cleansers on the wound.  Gently pat it dry. Put on a new bandage, if needed. Do not reuse bandages.  · If the area gets wet, gently pat it dry with a clean cloth. Replace the wet bandage with a dry one.  · Check the wound daily for signs of infection. (These are listed under "When to seek medical advice" below.)  · You may shower and bathe as usual. Swimming is now permitted.  Follow-up care  Follow up with your healthcare provider as advised.  When to seek medical advice   Call your healthcare provider if any of the following occur:  · Wound reopens or bleeds  · Signs of an infection, such as:  ¨ Increasing redness or swelling around the wound  ¨ Increased warmth from the wound  ¨ Worsening pain  ¨ Red streaking lines away from the wound  ¨ Fluid draining from the wound  · Fever of 100.4°F (38°C) or higher, or as directed by your child's healthcare provider  Date Last Reviewed: 9/27/2015  © 6907-6763 [employer]. [address] [place] [address], [place] [address]. All rights reserved. This information is not intended as a substitute for professional medical care. Always follow your healthcare professional's instructions.        "

## 2018-08-06 NOTE — PROGRESS NOTES
Subjective:       Patient ID: Bijan Flores Jr. is a 89 y.o. male.    Chief Complaint: Suture / Staple Removal    Suture / Staple Removal   The sutures were placed 11 to 14 days ago. He tried regular soap and water washings since the wound repair. The treatment provided significant relief. There has been no drainage from the wound. There is no redness present. There is no swelling present. There is no pain present. He has no difficulty moving the affected extremity or digit.     Review of Systems   Constitutional: Negative for fatigue and fever.   Respiratory: Negative for shortness of breath, wheezing and stridor.    Cardiovascular: Negative for chest pain, palpitations and leg swelling.   Genitourinary: Negative for difficulty urinating.   Skin: Positive for wound. Negative for color change.   Allergic/Immunologic: Negative for environmental allergies.   Neurological: Negative for dizziness and light-headedness.   Psychiatric/Behavioral: Negative for agitation.       Objective:      Physical Exam   Constitutional: He appears well-developed and well-nourished.   Musculoskeletal:        Right hand: He exhibits no tenderness and no swelling.        Hands:  Skin: Skin is warm.   Psychiatric: He has a normal mood and affect.   Nursing note and vitals reviewed.      Assessment:       1. Visit for suture removal        Plan:         Bijan was seen today for suture / staple removal.    Diagnoses and all orders for this visit:    Visit for suture removal  Comments:  Laceration well approximated. 10 sutures removed intact.  Cleaned with 500 ml sterile water cover with bandaid.  Patient tolerated    Follow prescribed treatment plan as directed.  Stay hydrated and rest.  Report to ER if symptoms worsen.  Follow up with PCP in 2-3 days or sooner if symptoms do not improve.

## 2018-08-08 ENCOUNTER — NURSE TRIAGE (OUTPATIENT)
Dept: ADMINISTRATIVE | Facility: CLINIC | Age: 83
End: 2018-08-08

## 2018-08-08 NOTE — TELEPHONE ENCOUNTER
Patient spouse notified of the following instructions;    HOLD ALDACTONE , CHECK DAILY BP  AM AND BEDTIME .    Spouse repeated instructions back correctly.

## 2018-08-08 NOTE — TELEPHONE ENCOUNTER
Reason for Disposition   SEVERE dizziness (e.g., unable to stand, requires support to walk, feels like passing out now)    Protocols used: ST DIZZINESS-A-OH    Wife states that pt started having dizziness last night. States if he stands up he does have to hold on to her so that he doesn't fall down. ED advised per protocol. Wife would like to speak to pt cardiologist, please call to advise.

## 2018-08-08 NOTE — TELEPHONE ENCOUNTER
Call and spoke with spouse. Spouse stated patient had not been feeling well lately, recent fall due to someone bumping in to him.  Patient with episode of extreme dizziness, lightheadedness requiring  Assistance,  or lower.  Patient remains alert throughout dizziness, lightheadedness.    Please advise

## 2018-08-13 ENCOUNTER — TELEPHONE (OUTPATIENT)
Dept: CARDIOLOGY | Facility: CLINIC | Age: 83
End: 2018-08-13

## 2018-08-13 DIAGNOSIS — I10 ESSENTIAL HYPERTENSION: ICD-10-CM

## 2018-08-13 RX ORDER — SPIRONOLACTONE 25 MG/1
25 TABLET ORAL EVERY OTHER DAY
Qty: 45 TABLET | Refills: 3 | Status: CANCELLED
Start: 2018-08-13

## 2018-08-13 NOTE — TELEPHONE ENCOUNTER
----- Message from Lesa Shay sent at 8/13/2018 12:43 PM CDT -----  Contact: pt wife  Calling in regards to blood pressure readings and please advise [phone]

## 2018-08-13 NOTE — TELEPHONE ENCOUNTER
Returned call. Spouse stated patient still tired all the time, poor appetite and episodes of dizziness.    Aldactone 50mg was stopped 1 week ago per MD orders.  Blood pressures BID (early morning and evening) since stopping aldactone 50mg    194/92         181/92     103/65  150/84      105/58  142/77      130/68  145/86      129/70  200/93       147/67  178/83       154/71    Heart rate 60's - 70's    Orders per Dr. Scott  Aldactone 25mg by mouth every other day.  Phone followup in 1 week.    Spouse repeated instructions back correctly.

## 2018-09-04 ENCOUNTER — OFFICE VISIT (OUTPATIENT)
Dept: INTERNAL MEDICINE | Facility: CLINIC | Age: 83
End: 2018-09-04
Payer: MEDICARE

## 2018-09-04 ENCOUNTER — TELEPHONE (OUTPATIENT)
Dept: INTERNAL MEDICINE | Facility: CLINIC | Age: 83
End: 2018-09-04

## 2018-09-04 ENCOUNTER — LAB VISIT (OUTPATIENT)
Dept: LAB | Facility: HOSPITAL | Age: 83
End: 2018-09-04
Attending: FAMILY MEDICINE
Payer: MEDICARE

## 2018-09-04 ENCOUNTER — TELEPHONE (OUTPATIENT)
Dept: CARDIOLOGY | Facility: CLINIC | Age: 83
End: 2018-09-04

## 2018-09-04 VITALS
HEART RATE: 77 BPM | WEIGHT: 183.88 LBS | DIASTOLIC BLOOD PRESSURE: 60 MMHG | BODY MASS INDEX: 25.74 KG/M2 | HEIGHT: 71 IN | SYSTOLIC BLOOD PRESSURE: 150 MMHG | OXYGEN SATURATION: 96 % | TEMPERATURE: 99 F

## 2018-09-04 DIAGNOSIS — Z91.81 PERSONAL HISTORY OF FALL: Chronic | ICD-10-CM

## 2018-09-04 DIAGNOSIS — I95.1 ORTHOSTATIC HYPOTENSION: Primary | Chronic | ICD-10-CM

## 2018-09-04 DIAGNOSIS — S09.90XA INJURY OF HEAD, INITIAL ENCOUNTER: ICD-10-CM

## 2018-09-04 DIAGNOSIS — Z74.09 IMPAIRED FUNCTIONAL MOBILITY, BALANCE, GAIT, AND ENDURANCE: ICD-10-CM

## 2018-09-04 DIAGNOSIS — D63.1 ANEMIA DUE TO STAGE 3 CHRONIC KIDNEY DISEASE: ICD-10-CM

## 2018-09-04 DIAGNOSIS — N18.30 CHRONIC KIDNEY DISEASE, STAGE 3: Chronic | ICD-10-CM

## 2018-09-04 DIAGNOSIS — N18.30 ANEMIA DUE TO STAGE 3 CHRONIC KIDNEY DISEASE: ICD-10-CM

## 2018-09-04 DIAGNOSIS — I10 BENIGN ESSENTIAL HYPERTENSION: ICD-10-CM

## 2018-09-04 DIAGNOSIS — S09.90XA HEAD TRAUMA, INITIAL ENCOUNTER: ICD-10-CM

## 2018-09-04 DIAGNOSIS — R26.89 IMPAIRMENT OF BALANCE: ICD-10-CM

## 2018-09-04 LAB
ALBUMIN SERPL BCP-MCNC: 4.3 G/DL
ANION GAP SERPL CALC-SCNC: 12 MMOL/L
BASOPHILS # BLD AUTO: 0.04 K/UL
BASOPHILS NFR BLD: 0.5 %
BUN SERPL-MCNC: 19 MG/DL
CALCIUM SERPL-MCNC: 9.5 MG/DL
CHLORIDE SERPL-SCNC: 100 MMOL/L
CO2 SERPL-SCNC: 25 MMOL/L
CREAT SERPL-MCNC: 1.4 MG/DL
DIFFERENTIAL METHOD: ABNORMAL
EOSINOPHIL # BLD AUTO: 0.5 K/UL
EOSINOPHIL NFR BLD: 6.5 %
ERYTHROCYTE [DISTWIDTH] IN BLOOD BY AUTOMATED COUNT: 12.8 %
EST. GFR  (AFRICAN AMERICAN): 50.8 ML/MIN/1.73 M^2
EST. GFR  (NON AFRICAN AMERICAN): 43.9 ML/MIN/1.73 M^2
GLUCOSE SERPL-MCNC: 108 MG/DL
HCT VFR BLD AUTO: 44.7 %
HGB BLD-MCNC: 14.7 G/DL
IMM GRANULOCYTES # BLD AUTO: 0.03 K/UL
IMM GRANULOCYTES NFR BLD AUTO: 0.4 %
LYMPHOCYTES # BLD AUTO: 1.4 K/UL
LYMPHOCYTES NFR BLD: 17.1 %
MCH RBC QN AUTO: 30.9 PG
MCHC RBC AUTO-ENTMCNC: 32.9 G/DL
MCV RBC AUTO: 94 FL
MONOCYTES # BLD AUTO: 0.7 K/UL
MONOCYTES NFR BLD: 8.8 %
NEUTROPHILS # BLD AUTO: 5.5 K/UL
NEUTROPHILS NFR BLD: 66.7 %
NRBC BLD-RTO: 0 /100 WBC
PHOSPHATE SERPL-MCNC: 3.6 MG/DL
PLATELET # BLD AUTO: 141 K/UL
PMV BLD AUTO: 12 FL
POTASSIUM SERPL-SCNC: 4.3 MMOL/L
RBC # BLD AUTO: 4.76 M/UL
SODIUM SERPL-SCNC: 137 MMOL/L
WBC # BLD AUTO: 8.18 K/UL

## 2018-09-04 PROCEDURE — 85025 COMPLETE CBC W/AUTO DIFF WBC: CPT

## 2018-09-04 PROCEDURE — 99214 OFFICE O/P EST MOD 30 MIN: CPT | Mod: S$PBB,,, | Performed by: FAMILY MEDICINE

## 2018-09-04 PROCEDURE — 36415 COLL VENOUS BLD VENIPUNCTURE: CPT | Mod: PO

## 2018-09-04 PROCEDURE — 80069 RENAL FUNCTION PANEL: CPT

## 2018-09-04 PROCEDURE — 99999 PR PBB SHADOW E&M-EST. PATIENT-LVL IV: CPT | Mod: PBBFAC,,, | Performed by: FAMILY MEDICINE

## 2018-09-04 PROCEDURE — 99214 OFFICE O/P EST MOD 30 MIN: CPT | Mod: PBBFAC,PO | Performed by: FAMILY MEDICINE

## 2018-09-04 NOTE — Clinical Note
Dr. Tyler Terrazas. They told me that you wanted to see a copy of my note for today. Let me know if you have any questions or suggestions. Thanks.

## 2018-09-04 NOTE — TELEPHONE ENCOUNTER
Called patient spouse, who stated patient  having difficulty walking and falls.    Brother in law, who is a neurologist stopped flomax and aldactone.    Patient scheduled to see PCP today.    Spouse was instructed to keep PCP appt and call once seen.    Spouse verbalized understanding.

## 2018-09-04 NOTE — TELEPHONE ENCOUNTER
Was called today by doctors recommend patient's neurologist.  Was having significant weakness and could not walk very well and has had falls.  Doctors recommend went to the patient's house and noted that he was severely orthostatic.  Noted that he was on Diovan HCT and spironolactone.  Wanted to see about adjusting some medications and also getting him in to be seen further.  Also wanted to see about getting him some home health PT.  Did discuss with doctors recommend that he is having blood pressure currently managed by Dr. Mota.  Will forward message to Dr. Mota to see if he would like to see him 1st her otherwise we will get him an with myself or another primary care provider to adjust medications further and consideration for home health being set up.  Please set up urgent visit with either Dr. Mota or myself or primary care  [facility].       In interim between today and appointment have patient hold the spironolactone.

## 2018-09-04 NOTE — TELEPHONE ENCOUNTER
4/17/18  Upcoming 10/17/18        Dr. Nieto's office called. Asked to have Dr. Hewitt call him. Gave Dr. Hewitt message.

## 2018-09-05 ENCOUNTER — HOSPITAL ENCOUNTER (OUTPATIENT)
Dept: RADIOLOGY | Facility: HOSPITAL | Age: 83
Discharge: HOME | End: 2018-09-05
Attending: FAMILY MEDICINE
Payer: MEDICARE

## 2018-09-05 DIAGNOSIS — R26.89 IMPAIRMENT OF BALANCE: ICD-10-CM

## 2018-09-05 DIAGNOSIS — S09.90XA HEAD TRAUMA, INITIAL ENCOUNTER: ICD-10-CM

## 2018-09-05 DIAGNOSIS — S09.90XA INJURY OF HEAD, INITIAL ENCOUNTER: ICD-10-CM

## 2018-09-05 PROCEDURE — 70551 MRI BRAIN STEM W/O DYE: CPT | Mod: TC,PO

## 2018-09-05 PROCEDURE — 70551 MRI BRAIN STEM W/O DYE: CPT | Mod: 26,,, | Performed by: RADIOLOGY

## 2018-09-06 ENCOUNTER — TELEPHONE (OUTPATIENT)
Dept: CARDIOLOGY | Facility: CLINIC | Age: 83
End: 2018-09-06

## 2018-09-06 NOTE — TELEPHONE ENCOUNTER
----- Message from Radha Lomeli sent at 9/6/2018 10:22 AM CDT -----  Contact: spouse  Requesting a call back to discuss pt in hospital because of broken hip. Please call back [phone].      Thanks,  Radha Lomeli

## 2018-09-06 NOTE — TELEPHONE ENCOUNTER
Returned call and spoke with spouse. Patient fell and broke broke bone below hip, requiring 2 screws.

## 2018-09-11 NOTE — PROGRESS NOTES
He was supposed to return to see me for reevaluation toward the end of last week, but he did not. Please call to see how he is doing and give me a report. If he asks about his MRI, you can tell him it showed a prior stroke and age-related changes, but nothing new/acute. Thanks.

## 2018-09-13 ENCOUNTER — TELEPHONE (OUTPATIENT)
Dept: INTERNAL MEDICINE | Facility: CLINIC | Age: 83
End: 2018-09-13

## 2018-09-13 ENCOUNTER — TELEPHONE (OUTPATIENT)
Dept: CARDIOLOGY | Facility: CLINIC | Age: 83
End: 2018-09-13

## 2018-09-13 NOTE — TELEPHONE ENCOUNTER
----- Message from Don Eugene sent at 9/13/2018 10:17 AM CDT -----  Contact: Wife [phone]  Returning call, please call back at [phone].  Md Eren

## 2018-09-13 NOTE — TELEPHONE ENCOUNTER
Spoke with patients wife, results of mri given, voices understanding, patient is in the hospital right now s/p surgery for fracture

## 2018-09-13 NOTE — TELEPHONE ENCOUNTER
I returned call. Just FYI. Updating Dr Scott/Christine pt is doing well. At [facility] right now. She can be reached at home # listed.

## 2018-09-14 NOTE — PROGRESS NOTES
"Bijan Terrazas.    I have reviewed the results of recent tests you had done.    I'm happy to report that your test results are all NORMAL or at least ACCEPTABLE.    The complete blood count (CBC) checks for anemia and measures the red blood cells, white blood cells, and platelets in your blood. Your CBC is NORMAL except that your platelets are MILDLY low, but your platelet level is they are IMPROVED from 3 months ago.    Your renal (kidney) function panel is ABNORMAL but IMPROVED from your last kidney test results.    Want to learn more about your test results and what they mean? It's as simple as 1, 2, 3.     (1) Log in to your [facility] account at [email or web address]     (2) From the "View test results" tab, click on the test you want to know more about.     (3) Click on the "About This Test" link.    If you have any questions about your test results, write them down and bring them with you to your next appointment. You can call or message me in the meantime if you have urgent questions.    Thank you for letting me care for you. I look forward to seeing you at your next appointment.    Sincerely,    MAIRTZA Padilla MD"

## 2018-11-19 ENCOUNTER — TELEPHONE (OUTPATIENT)
Dept: CARDIOLOGY | Facility: CLINIC | Age: 83
End: 2018-11-19

## 2018-11-19 NOTE — TELEPHONE ENCOUNTER
Returned call. Spoke with patient sarah Isabel, who informed us to cancel all appointment due to patient on hospice and not likely to make 11/26/2018 appt.

## 2018-11-19 NOTE — TELEPHONE ENCOUNTER
----- Message from Lesa Shay sent at 11/19/2018  3:12 PM CST -----  Contact: pt wife  Calling in regards with  questions about upcoming appointment and please advise.  [phone] (home)

## 2019-09-09 ENCOUNTER — PATIENT OUTREACH (OUTPATIENT)
Dept: ADMINISTRATIVE | Facility: HOSPITAL | Age: 84
End: 2019-09-09

## 2019-09-09 NOTE — PROGRESS NOTES
I have attempted without success to contact this patient by phone to schedule annual exam. Patient not available, left voicemail.

## 2022-10-27 NOTE — TELEPHONE ENCOUNTER
Approved Medications   spironolactone (ALDACTONE) 25 MG tablet  Take 2 tablets (50 mg total) by mouth once daily.       Disp: 180 tablet Refills: 3    Class: Normal Start: 7/17/2017   For: Essential hypertension  Approved by: Geremias Scott MD  To be filled at: [facility]/pharmacy #[id number] - [place] [id number]: [phone]    
on unit